# Patient Record
Sex: FEMALE | Race: BLACK OR AFRICAN AMERICAN | NOT HISPANIC OR LATINO | Employment: FULL TIME | URBAN - METROPOLITAN AREA
[De-identification: names, ages, dates, MRNs, and addresses within clinical notes are randomized per-mention and may not be internally consistent; named-entity substitution may affect disease eponyms.]

---

## 2018-05-04 PROBLEM — G47.33 OSA ON CPAP: Status: ACTIVE | Noted: 2018-05-04

## 2018-05-04 PROBLEM — Z00.00 PHYSICAL EXAM, ANNUAL: Status: ACTIVE | Noted: 2018-05-04

## 2018-05-04 PROBLEM — I11.9 HYPERTENSIVE LEFT VENTRICULAR HYPERTROPHY, WITHOUT HEART FAILURE: Status: ACTIVE | Noted: 2018-05-04

## 2018-05-04 PROBLEM — K91.2 POSTSURGICAL NONABSORPTION: Status: ACTIVE | Noted: 2018-05-04

## 2018-05-04 PROBLEM — E06.3 HASHIMOTO'S THYROIDITIS: Status: ACTIVE | Noted: 2017-02-22

## 2018-05-04 PROBLEM — E78.5 HYPERLIPIDEMIA: Status: ACTIVE | Noted: 2018-05-04

## 2018-05-04 PROBLEM — E55.9 VITAMIN D DEFICIENCY: Status: ACTIVE | Noted: 2018-05-04

## 2018-05-11 PROBLEM — D64.9 ANEMIA: Status: ACTIVE | Noted: 2018-05-11

## 2018-08-06 PROBLEM — Z00.00 PHYSICAL EXAM, ANNUAL: Status: RESOLVED | Noted: 2018-05-04 | Resolved: 2018-08-06

## 2018-12-20 PROBLEM — R74.8 ELEVATED LIVER ENZYMES: Status: ACTIVE | Noted: 2018-12-20

## 2018-12-20 PROBLEM — Z20.1 EXPOSURE TO TB: Status: ACTIVE | Noted: 2018-12-20

## 2019-01-03 PROBLEM — Z98.84 H/O BARIATRIC SURGERY: Status: ACTIVE | Noted: 2018-06-27

## 2019-01-28 PROBLEM — R74.8 ELEVATED LIVER ENZYMES: Status: RESOLVED | Noted: 2018-12-20 | Resolved: 2019-01-28

## 2019-05-23 PROBLEM — Z11.3 ROUTINE SCREENING FOR STI (SEXUALLY TRANSMITTED INFECTION): Status: ACTIVE | Noted: 2018-05-04

## 2019-05-23 PROBLEM — D72.819 LEUKOPENIA: Status: ACTIVE | Noted: 2019-05-23

## 2019-05-23 PROBLEM — M21.619 BUNION: Status: ACTIVE | Noted: 2019-05-23

## 2019-08-09 PROBLEM — Z01.818 PREOP TESTING: Status: ACTIVE | Noted: 2018-05-04

## 2019-08-10 PROBLEM — I11.0 HYPERTENSIVE LEFT VENTRICULAR HYPERTROPHY WITH HEART FAILURE: Status: ACTIVE | Noted: 2018-05-04

## 2019-11-29 PROBLEM — Z20.1 EXPOSURE TO TB: Status: RESOLVED | Noted: 2018-12-20 | Resolved: 2019-11-29

## 2019-11-29 PROBLEM — Z01.818 PREOP TESTING: Status: RESOLVED | Noted: 2018-05-04 | Resolved: 2019-11-29

## 2019-12-09 PROBLEM — R80.9 MICROALBUMINURIA: Status: ACTIVE | Noted: 2019-12-09

## 2020-03-24 PROBLEM — R05.9 COUGH: Status: ACTIVE | Noted: 2020-03-24

## 2020-07-30 PROBLEM — R05.9 COUGH: Status: RESOLVED | Noted: 2020-03-24 | Resolved: 2020-07-30

## 2020-09-18 ENCOUNTER — LAB VISIT (OUTPATIENT)
Dept: LAB | Facility: HOSPITAL | Age: 40
End: 2020-09-18
Attending: INTERNAL MEDICINE
Payer: COMMERCIAL

## 2020-09-18 ENCOUNTER — OFFICE VISIT (OUTPATIENT)
Dept: HEMATOLOGY/ONCOLOGY | Facility: CLINIC | Age: 40
End: 2020-09-18
Attending: FAMILY MEDICINE
Payer: COMMERCIAL

## 2020-09-18 VITALS
WEIGHT: 233.94 LBS | SYSTOLIC BLOOD PRESSURE: 143 MMHG | HEART RATE: 83 BPM | OXYGEN SATURATION: 100 % | DIASTOLIC BLOOD PRESSURE: 95 MMHG | HEIGHT: 62 IN | TEMPERATURE: 97 F | BODY MASS INDEX: 43.05 KG/M2

## 2020-09-18 DIAGNOSIS — D64.9 ANEMIA, UNSPECIFIED TYPE: ICD-10-CM

## 2020-09-18 DIAGNOSIS — N92.4 EXCESSIVE BLEEDING IN PREMENOPAUSAL PERIOD: ICD-10-CM

## 2020-09-18 DIAGNOSIS — D50.9 IRON DEFICIENCY ANEMIA, UNSPECIFIED IRON DEFICIENCY ANEMIA TYPE: Primary | ICD-10-CM

## 2020-09-18 DIAGNOSIS — Z86.718 HISTORY OF DEEP VENOUS THROMBOSIS (DVT) OF DISTAL VEIN OF RIGHT LOWER EXTREMITY: ICD-10-CM

## 2020-09-18 DIAGNOSIS — D72.819 LEUKOPENIA, UNSPECIFIED TYPE: ICD-10-CM

## 2020-09-18 LAB
BASOPHILS # BLD AUTO: 0.04 K/UL (ref 0–0.2)
BASOPHILS NFR BLD: 0.7 % (ref 0–1.9)
DIFFERENTIAL METHOD: ABNORMAL
EOSINOPHIL # BLD AUTO: 0.1 K/UL (ref 0–0.5)
EOSINOPHIL NFR BLD: 1.5 % (ref 0–8)
ERYTHROCYTE [DISTWIDTH] IN BLOOD BY AUTOMATED COUNT: 20.5 % (ref 11.5–14.5)
FERRITIN SERPL-MCNC: 30 NG/ML (ref 20–300)
HCT VFR BLD AUTO: 40 % (ref 37–48.5)
HGB BLD-MCNC: 12.4 G/DL (ref 12–16)
IMM GRANULOCYTES # BLD AUTO: 0.02 K/UL (ref 0–0.04)
IMM GRANULOCYTES NFR BLD AUTO: 0.3 % (ref 0–0.5)
IRON SERPL-MCNC: 38 UG/DL (ref 30–160)
LYMPHOCYTES # BLD AUTO: 2.1 K/UL (ref 1–4.8)
LYMPHOCYTES NFR BLD: 35.4 % (ref 18–48)
MCH RBC QN AUTO: 27.4 PG (ref 27–31)
MCHC RBC AUTO-ENTMCNC: 31 G/DL (ref 32–36)
MCV RBC AUTO: 89 FL (ref 82–98)
MONOCYTES # BLD AUTO: 0.6 K/UL (ref 0.3–1)
MONOCYTES NFR BLD: 9.4 % (ref 4–15)
NEUTROPHILS # BLD AUTO: 3.1 K/UL (ref 1.8–7.7)
NEUTROPHILS NFR BLD: 52.7 % (ref 38–73)
NRBC BLD-RTO: 0 /100 WBC
PATH REV BLD -IMP: NORMAL
PLATELET # BLD AUTO: 272 K/UL (ref 150–350)
PMV BLD AUTO: 9.1 FL (ref 9.2–12.9)
RBC # BLD AUTO: 4.52 M/UL (ref 4–5.4)
SATURATED IRON: 9 % (ref 20–50)
TOTAL IRON BINDING CAPACITY: 429 UG/DL (ref 250–450)
TRANSFERRIN SERPL-MCNC: 290 MG/DL (ref 200–375)
WBC # BLD AUTO: 5.84 K/UL (ref 3.9–12.7)

## 2020-09-18 PROCEDURE — 99999 PR PBB SHADOW E&M-EST. PATIENT-LVL V: CPT | Mod: PBBFAC,,, | Performed by: INTERNAL MEDICINE

## 2020-09-18 PROCEDURE — 36415 COLL VENOUS BLD VENIPUNCTURE: CPT

## 2020-09-18 PROCEDURE — 99204 OFFICE O/P NEW MOD 45 MIN: CPT | Mod: S$GLB,,, | Performed by: INTERNAL MEDICINE

## 2020-09-18 PROCEDURE — 99204 PR OFFICE/OUTPT VISIT, NEW, LEVL IV, 45-59 MIN: ICD-10-PCS | Mod: S$GLB,,, | Performed by: INTERNAL MEDICINE

## 2020-09-18 PROCEDURE — 85025 COMPLETE CBC W/AUTO DIFF WBC: CPT

## 2020-09-18 PROCEDURE — 83540 ASSAY OF IRON: CPT

## 2020-09-18 PROCEDURE — 99999 PR PBB SHADOW E&M-EST. PATIENT-LVL V: ICD-10-PCS | Mod: PBBFAC,,, | Performed by: INTERNAL MEDICINE

## 2020-09-18 PROCEDURE — 82728 ASSAY OF FERRITIN: CPT

## 2020-09-18 NOTE — PATIENT INSTRUCTIONS
"Labs today, cbc, iron/tibc, ferritin, peripheral blood smear  She will provide fax for "clearance"  RV in 3 months with repeat iron levels 1 week prior; cbc, iron/tibc, ferritin  "

## 2020-09-18 NOTE — PROGRESS NOTES
Subjective:      DATE OF VISIT: 9/18/20     ?  Patient ID:?Marcia Hazel is a 40 y.o. female.?? MR#: [id number]   ?   REFERRING PROVIDER: Marcia Calix MD  [address] [address]     ? Primary Care Providers:  Marcia Calix MD, MD (General)     CHIEF COMPLAINT: ?  Iron deficiency anemia??   ?   HPI    I had the pleasure meeting scale 40-year-old woman with history of obesity status post gastric sleeve 2016 with hiatal hernia and GERD looking forward to hiatal hernia surgery.  She also has history of menorrhagia and has been following with Gynecology.  She notes prior use of estrogen containing oral contraceptive and has been off since May. She does note history of right lower extremity DVT in calf in the setting of immobility after a surgery and completed about 6 months of Eliquis.     She has been following by outside hematologist/oncologist for history of iron deficiency anemia.  She notes in the past receiving 8 doses of IV iron and more recently in August receive 4 more doses of IV iron therapy with subsequent lab work showing mild anemia hemoglobin 11, normal saturation, TIBC, transferrin.  No ferritin checked.  Patient also notes her outside oncologist had concerns about white blood cell count however per these labs within normal limits and normal differential.    She continues to have menorrhagia.  She denies melena, hematochezia, hematuria, hemoptysis, hematemesis, fever, chills, recurrent infection/hospitalization, or unintentional weight loss.  She does endorse reflux disease and looking forward to surgery but requires clearance from different specialties including E hematology.    She has mammogram for breast cancer screening which she reports is benign.  She has never had colonoscopy.  No family history of malignancy.    Review of Systems    ?   A comprehensive 14-point review of systems was reviewed with patient and was negative other than as specified above.   ?   PAST  MEDICAL HISTORY:   Past Medical History:   Diagnosis Date    Acquired hypothyroidism     Benign hypertension     Encounter for general adult medical examination without abnormal findings 04/10/2017    GERD (gastroesophageal reflux disease)     Hyperlipidemia     Hyperlipidemia     Iron deficiency anemia     Metabolic syndrome X     ANTONIA on CPAP     Postsurgical malabsorption     ?     PAST SURGICAL HISTORY:   Past Surgical History:   Procedure Laterality Date    BELT ABDOMINOPLASTY      BUNIONECTOMY Right     GASTROSTOMY      partial    HERNIA REPAIR      MYOMECTOMY        ?   ALLERGIES:   Allergies as of 09/18/2020 - Reviewed 09/18/2020   Allergen Reaction Noted    Lisinopril Swelling 02/22/2017      ?   MEDICATIONS:?   Outpatient Medications Marked as Taking for the 9/18/20 encounter (Office Visit) with Desire Villa MD   Medication Sig Dispense Refill    carvediloL (COREG) 12.5 MG tablet TAKE 1 TABLET(12.5 MG) BY MOUTH TWICE DAILY WITH A MEAL 60 tablet 2    chlorhexidine (PERIDEX) 0.12 % solution   0    cyclobenzaprine (FLEXERIL) 10 MG tablet TAKE 1 TABLET(10 MG) BY MOUTH THREE TIMES DAILY AS NEEDED 30 tablet 2    dexAMETHasone (DECADRON) 1 MG Tab Patient to take pill at midnight and then get FASTING labs done eight hours later (8am) as per ordered by physician.      docusate sodium (COLACE) 100 MG capsule Take 100 mg by mouth 2 (two) times daily.      drospirenone, contraceptive, (SLYND) 4 mg (28) Tab       ergocalciferol (ERGOCALCIFEROL) 50,000 unit Cap TK 1 C PO 1 TIME A WK  5    furosemide (LASIX) 20 MG tablet TK 1 T PO  BID      hydroCHLOROthiazide (HYDRODIURIL) 25 MG tablet TAKE 1 TABLET BY MOUTH DAILY 30 tablet 3    levothyroxine (SYNTHROID) 137 MCG Tab tablet Take 137 mcg by mouth.      metFORMIN (GLUCOPHAGE-XR) 500 MG 24 hr tablet 2 tablets twice daily  11    oxyCODONE-acetaminophen (PERCOCET) 5-325 mg per tablet TAKE 1 TO 2 TABLETS BY MOUTH EVERY 4 TO 6 HOURS PRN P  0     pantoprazole (PROTONIX) 40 MG tablet TK 1 T PO  BID  1    valACYclovir (VALTREX) 1000 MG tablet TK 1 T PO ONCE D  2      ?   SOCIAL HISTORY:?   Social History     Tobacco Use    Smoking status: Never Smoker    Smokeless tobacco: Never Used   Substance Use Topics    Alcohol use: Yes     Comment: social      ?      ?   FAMILY HISTORY:   family history includes Diabetes in her mother; Hypertension in her mother.   ?        Objective:      Physical Exam      ?   Vitals:    09/18/20 0852   BP: (!) 143/95   Pulse: 83   Temp: 97 °F (36.1 °C)      ?ECOG:?0   General appearance: Generally well appearing, in no acute distress.   Head, eyes, ears, nose, and throat: moist mucous membranes.   Respiratory:  Normal work of breathing  Abdomen: nontender, nondistended.   Extremities: Warm, without edema.   Neurologic: Alert and oriented. Grossly normal strength, coordination, and gait.   Skin: No rashes, ecchymoses or petechial lesion.   Psychiatric:  Normal mood and affect.    ?   Laboratory:  ?   No visits with results within 1 Day(s) from this visit.   Latest known visit with results is:   Office Visit on 07/17/2020   Component Date Value Ref Range Status    CREATININE URINE 07/17/2020 100   Final    Albumin, U 07/17/2020 30   Final    Albumin Creatinine Ratio 07/17/2020 * mg/g Final    Spec Grav UA 07/17/2020 1.015   Final    pH, UA 07/17/2020 8.0   Final    WBC, UA 07/17/2020 neg   Final    Blood, UA 07/17/2020 neg   Final    Nitrite, UA 07/17/2020 neg   Final    Ketones, UA 07/17/2020 neg   Final    Bilirubin 07/17/2020 neg   Final    Urobilinogen, UA 07/17/2020 0.2   Final    Protein 07/17/2020 neg   Final    Glucose, UA 07/17/2020 neg   Final    Bacteria 07/17/2020 None Seen  None Seen, Occasional, 1-5 Final    Epithelial Cells, Ua 07/17/2020 occ   Final    Glucose 07/17/2020 77  65 - 99 mg/dL Final    BUN, Bld 07/17/2020 16  6 - 24 mg/dL Final    Creatinine 07/17/2020 0.82  0.57 - 1.00 mg/dL  Final    eGFR if non African American 07/17/2020 89  >59 mL/min/1.73 Final    eGFR if African American 07/17/2020 103  >59 mL/min/1.73 Final    BUN/Creatinine Ratio 07/17/2020 20  9 - 23 Final    Sodium 07/17/2020 139  134 - 144 mmol/L Final    Potassium 07/17/2020 4.0  3.5 - 5.2 mmol/L Final    Chloride 07/17/2020 101  96 - 106 mmol/L Final    CO2 07/17/2020 27  20 - 29 mmol/L Final    Calcium 07/17/2020 9.3  8.7 - 10.2 mg/dL Final    Total Protein 07/17/2020 6.8  6.0 - 8.5 g/dL Final    Albumin 07/17/2020 3.8  3.8 - 4.8 g/dL Final    Globulin, Total 07/17/2020 3.0  1.5 - 4.5 g/dL Final    Albumin/Globulin Ratio 07/17/2020 1.3  1.2 - 2.2 Final    Total Bilirubin 07/17/2020 <0.2  0.0 - 1.2 mg/dL Final    Alkaline Phosphatase 07/17/2020 52  39 - 117 IU/L Final    AST 07/17/2020 17  0 - 40 IU/L Final    ALT 07/17/2020 10  0 - 32 IU/L Final    Hep A IgM 07/17/2020 Negative  Negative Final    Hepatitis B Surface Ag 07/17/2020 Negative  Negative Final    Hep B C IgM 07/17/2020 Negative  Negative Final    Hep C Virus Ab Signal/Cutoff 07/17/2020 0.1  0.0 - 0.9 s/co ratio Final    HIV Screen 4th Generation wRfx 07/17/2020 Non Reactive  Non Reactive Final    Cholesterol 07/17/2020 172  100 - 199 mg/dL Final    Triglycerides 07/17/2020 57  0 - 149 mg/dL Final    HDL 07/17/2020 74  >39 mg/dL Final    VLDL Cholesterol Oren 07/17/2020 11  5 - 40 mg/dL Final    LDL Calculated 07/17/2020 87  0 - 99 mg/dL Final    RPR 07/17/2020 Non Reactive  Non Reactive Final    T3, Free 07/17/2020 2.5  2.0 - 4.4 pg/mL Final    T4, Free 07/17/2020 1.68  0.82 - 1.77 ng/dL Final    TSH 07/17/2020 0.656  0.450 - 4.500 uIU/mL Final    Vit D, 25-Hydroxy 07/17/2020 48.3  30.0 - 100.0 ng/mL Final          ?   Assessment/Plan:       1. Iron deficiency anemia, unspecified iron deficiency anemia type    2. Anemia, unspecified type    3. Excessive bleeding in premenopausal period    4. History of deep venous thrombosis (DVT) of  "distal vein of right lower extremity          Plan:     # anemia, iron deficiency:  May be multifactorial with menorrhagia and possible malabsorption with history of gastric sleeve, hiatal hernia and GERD.  Most recent labs show relatively normal hemoglobin 11 and iron studies from outside appear replete although no ferritin.  Will repeat complete set of iron indices and CBC along with peripheral blood smear to review given prior question of leukopenia.  Recommend follow-up with GI for hiatal hernia/GERD and Gynecology for menorrhagia.    # leukopenia: Labs reviewed from 2018 to 2020 show low normal white blood cell count with normal differential.  Will repeat CBC and obtain peripheral blood smear for review of morphology.  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  No concerning history of constitutional symptoms or recurrent infections.    # history of DVT right lower extremity:  She notes history of DVT right calf in August 2019 the setting of immobility with recent orthopedic surgery completed about 6 months of Eliquis.  Discussed oral contraceptives especially ask and estrogen containing can increase risk of thrombosis and would try to avoid given prior history of thrombosis although does appear to have been provoked.  She expressed understanding will discuss with her gynecologist.    Follow-Up:   Labs today, cbc, iron/tibc, ferritin, peripheral blood smear  She will provide fax for "clearance"  RV in 3 months with repeat iron levels 1 week prior; cbc, iron/tibc, ferritin    ADDENDUM 09/19/2020     Lab work shows resolution of prior anemia and iron deficiency although iron level low normal range.  White blood cell count and differential within normal limits.  Peripheral blood smear review pending at this time.  Given menorrhagia recommend oral iron supplementation to help in maintenance of iron levels e.g. ferrous sulfate 325 mg 1-2 tabs daily with stool softeners as needed.  " There does not appear to be any restrictions from hematologic perspective for her to post pursue planned surgery.      Lab Results   Component Value Date    IRON 38 09/18/2020    TIBC 429 09/18/2020    FERRITIN 30 09/18/2020     Lab Results   Component Value Date    WBC 5.84 09/18/2020    HGB 12.4 09/18/2020    HCT 40.0 09/18/2020    MCV 89 09/18/2020     09/18/2020

## 2020-09-18 NOTE — LETTER
September 18, 2020      Marcia Calix MD  [address]  [facility] [address]            [facility]  [address] [facility] [address]  Phone: [phone]  Fax: [phone]          Patient: Marcia Hazel   MR Number: [id number]   YOB: 1980   Date of Visit: 9/18/2020       Dear Dr. Marcia Calix:    Thank you for referring Marcia Hazel to me for evaluation. Attached you will find relevant portions of my assessment and plan of care.    If you have questions, please do not hesitate to call me. I look forward to following aMrcia Hazel along with you.    Sincerely,    Desire Villa MD    Enclosure  CC:  No Recipients    If you would like to receive this communication electronically, please contact [email or web address] or [phone] to request more information on [email or web address] Link access.    For providers and/or their staff who would like to refer a patient to [facility], please contact us through our one-stop-shop provider referral line, [facility], at [phone].    If you feel you have received this communication in error or would no longer like to receive these types of communications, please e-mail [email or web address]

## 2020-09-22 ENCOUNTER — TELEPHONE (OUTPATIENT)
Dept: HEMATOLOGY/ONCOLOGY | Facility: CLINIC | Age: 40
End: 2020-09-22

## 2020-09-22 NOTE — TELEPHONE ENCOUNTER
----- Message from Jailyn Coker sent at 9/22/2020  1:45 PM CDT -----  Contact: Jenni/Dr. Tsering Arteaga from Dr. Hanks called regarding a pre-op clearance for the pt. Please call her back at [phone].    Thanks,  Pb

## 2020-09-22 NOTE — TELEPHONE ENCOUNTER
----- Message from Agata Ramirez sent at 9/22/2020  4:17 PM CDT -----  .Type:  Patient Returning Call    Who Called:pt  Who Left Message for Patient:Gaye  Does the patient know what this is regarding?:yes  Would the patient rather a call back or [facility]? Call back  Best Call Back Number:[phone]  Additional Information:

## 2020-09-22 NOTE — TELEPHONE ENCOUNTER
----- Message from Andrés Palacios sent at 9/22/2020 11:29 AM CDT -----  .Type:  Needs Medical Advice    Who Called: INDRA MCCLENDON  Symptoms (please be specific):    How long has patient had these symptoms:    Pharmacy name and phone #:   Would the patient rather a call back or a response via [facility]?  Both   Best Call Back Number: [phone] (home)    Additional Information:   Pt is requesting a call back from the nurse in regards to the pt Dr office not receiving the fax from our office yet please

## 2020-09-22 NOTE — TELEPHONE ENCOUNTER
Nurse spoke with nurse at dr dover office to re fax dr ramachandran note they have not received the first one faxed this morning.

## 2020-09-29 ENCOUNTER — LAB VISIT (OUTPATIENT)
Dept: PRIMARY CARE CLINIC | Facility: OTHER | Age: 40
End: 2020-09-29
Attending: INTERNAL MEDICINE
Payer: COMMERCIAL

## 2020-09-29 DIAGNOSIS — Z03.818 ENCOUNTER FOR OBSERVATION FOR SUSPECTED EXPOSURE TO OTHER BIOLOGICAL AGENTS RULED OUT: ICD-10-CM

## 2020-09-29 PROCEDURE — U0003 INFECTIOUS AGENT DETECTION BY NUCLEIC ACID (DNA OR RNA); SEVERE ACUTE RESPIRATORY SYNDROME CORONAVIRUS 2 (SARS-COV-2) (CORONAVIRUS DISEASE [COVID-19]), AMPLIFIED PROBE TECHNIQUE, MAKING USE OF HIGH THROUGHPUT TECHNOLOGIES AS DESCRIBED BY CMS-2020-01-R: HCPCS

## 2020-10-01 LAB — SARS-COV-2 RNA RESP QL NAA+PROBE: NOT DETECTED

## 2020-10-20 ENCOUNTER — LAB VISIT (OUTPATIENT)
Dept: PRIMARY CARE CLINIC | Facility: OTHER | Age: 40
End: 2020-10-20
Attending: INTERNAL MEDICINE
Payer: COMMERCIAL

## 2020-10-20 DIAGNOSIS — Z03.818 ENCOUNTER FOR OBSERVATION FOR SUSPECTED EXPOSURE TO OTHER BIOLOGICAL AGENTS RULED OUT: ICD-10-CM

## 2020-10-20 PROCEDURE — U0003 INFECTIOUS AGENT DETECTION BY NUCLEIC ACID (DNA OR RNA); SEVERE ACUTE RESPIRATORY SYNDROME CORONAVIRUS 2 (SARS-COV-2) (CORONAVIRUS DISEASE [COVID-19]), AMPLIFIED PROBE TECHNIQUE, MAKING USE OF HIGH THROUGHPUT TECHNOLOGIES AS DESCRIBED BY CMS-2020-01-R: HCPCS

## 2020-10-21 LAB — SARS-COV-2 RNA RESP QL NAA+PROBE: NOT DETECTED

## 2020-12-03 DIAGNOSIS — D50.9 IRON DEFICIENCY ANEMIA, UNSPECIFIED IRON DEFICIENCY ANEMIA TYPE: Primary | ICD-10-CM

## 2020-12-15 ENCOUNTER — LAB VISIT (OUTPATIENT)
Dept: PRIMARY CARE CLINIC | Facility: OTHER | Age: 40
End: 2020-12-15
Payer: COMMERCIAL

## 2020-12-15 DIAGNOSIS — Z03.818 ENCOUNTER FOR OBSERVATION FOR SUSPECTED EXPOSURE TO OTHER BIOLOGICAL AGENTS RULED OUT: ICD-10-CM

## 2020-12-15 PROCEDURE — U0003 INFECTIOUS AGENT DETECTION BY NUCLEIC ACID (DNA OR RNA); SEVERE ACUTE RESPIRATORY SYNDROME CORONAVIRUS 2 (SARS-COV-2) (CORONAVIRUS DISEASE [COVID-19]), AMPLIFIED PROBE TECHNIQUE, MAKING USE OF HIGH THROUGHPUT TECHNOLOGIES AS DESCRIBED BY CMS-2020-01-R: HCPCS

## 2020-12-17 LAB — SARS-COV-2 RNA RESP QL NAA+PROBE: NOT DETECTED

## 2021-01-08 ENCOUNTER — LAB VISIT (OUTPATIENT)
Dept: LAB | Facility: HOSPITAL | Age: 41
End: 2021-01-08
Attending: INTERNAL MEDICINE
Payer: COMMERCIAL

## 2021-01-08 DIAGNOSIS — D50.9 IRON DEFICIENCY ANEMIA, UNSPECIFIED IRON DEFICIENCY ANEMIA TYPE: ICD-10-CM

## 2021-01-08 LAB
ANISOCYTOSIS BLD QL SMEAR: SLIGHT
BASOPHILS # BLD AUTO: 0.05 K/UL (ref 0–0.2)
BASOPHILS NFR BLD: 1.3 % (ref 0–1.9)
DIFFERENTIAL METHOD: ABNORMAL
EOSINOPHIL # BLD AUTO: 0.1 K/UL (ref 0–0.5)
EOSINOPHIL NFR BLD: 2.5 % (ref 0–8)
ERYTHROCYTE [DISTWIDTH] IN BLOOD BY AUTOMATED COUNT: 13.7 % (ref 11.5–14.5)
GIANT PLATELETS BLD QL SMEAR: PRESENT
HCT VFR BLD AUTO: 37.8 % (ref 37–48.5)
HGB BLD-MCNC: 11.5 G/DL (ref 12–16)
HYPOCHROMIA BLD QL SMEAR: ABNORMAL
IMM GRANULOCYTES # BLD AUTO: 0.01 K/UL (ref 0–0.04)
IMM GRANULOCYTES NFR BLD AUTO: 0.3 % (ref 0–0.5)
LYMPHOCYTES # BLD AUTO: 1.6 K/UL (ref 1–4.8)
LYMPHOCYTES NFR BLD: 40.5 % (ref 18–48)
MCH RBC QN AUTO: 29.8 PG (ref 27–31)
MCHC RBC AUTO-ENTMCNC: 30.4 G/DL (ref 32–36)
MCV RBC AUTO: 98 FL (ref 82–98)
MONOCYTES # BLD AUTO: 0.6 K/UL (ref 0.3–1)
MONOCYTES NFR BLD: 14.7 % (ref 4–15)
NEUTROPHILS # BLD AUTO: 1.6 K/UL (ref 1.8–7.7)
NEUTROPHILS NFR BLD: 40.7 % (ref 38–73)
NRBC BLD-RTO: 0 /100 WBC
OVALOCYTES BLD QL SMEAR: ABNORMAL
PATH REV BLD -IMP: NORMAL
PLATELET # BLD AUTO: 306 K/UL (ref 150–350)
PLATELET BLD QL SMEAR: ABNORMAL
PMV BLD AUTO: 9.7 FL (ref 9.2–12.9)
POIKILOCYTOSIS BLD QL SMEAR: SLIGHT
POLYCHROMASIA BLD QL SMEAR: ABNORMAL
RBC # BLD AUTO: 3.86 M/UL (ref 4–5.4)
SCHISTOCYTES BLD QL SMEAR: ABNORMAL
WBC # BLD AUTO: 3.95 K/UL (ref 3.9–12.7)

## 2021-01-08 PROCEDURE — 82728 ASSAY OF FERRITIN: CPT

## 2021-01-08 PROCEDURE — 83540 ASSAY OF IRON: CPT

## 2021-01-08 PROCEDURE — 36415 COLL VENOUS BLD VENIPUNCTURE: CPT

## 2021-01-08 PROCEDURE — 85060 BLOOD SMEAR INTERPRETATION: CPT | Mod: ,,, | Performed by: PATHOLOGY

## 2021-01-08 PROCEDURE — 85025 COMPLETE CBC W/AUTO DIFF WBC: CPT

## 2021-01-08 PROCEDURE — 85060 PATHOLOGIST REVIEW: ICD-10-PCS | Mod: ,,, | Performed by: PATHOLOGY

## 2021-01-09 LAB
FERRITIN SERPL-MCNC: 8 NG/ML (ref 20–300)
IRON SERPL-MCNC: 40 UG/DL (ref 30–160)
SATURATED IRON: 8 % (ref 20–50)
TOTAL IRON BINDING CAPACITY: 481 UG/DL (ref 250–450)
TRANSFERRIN SERPL-MCNC: 325 MG/DL (ref 200–375)

## 2021-01-11 LAB — PATH REV BLD -IMP: NORMAL

## 2021-01-12 ENCOUNTER — LAB VISIT (OUTPATIENT)
Dept: PRIMARY CARE CLINIC | Facility: OTHER | Age: 41
End: 2021-01-12
Payer: COMMERCIAL

## 2021-01-12 DIAGNOSIS — Z20.822 ENCOUNTER FOR LABORATORY TESTING FOR COVID-19 VIRUS: ICD-10-CM

## 2021-01-12 PROCEDURE — U0003 INFECTIOUS AGENT DETECTION BY NUCLEIC ACID (DNA OR RNA); SEVERE ACUTE RESPIRATORY SYNDROME CORONAVIRUS 2 (SARS-COV-2) (CORONAVIRUS DISEASE [COVID-19]), AMPLIFIED PROBE TECHNIQUE, MAKING USE OF HIGH THROUGHPUT TECHNOLOGIES AS DESCRIBED BY CMS-2020-01-R: HCPCS

## 2021-01-13 ENCOUNTER — OFFICE VISIT (OUTPATIENT)
Dept: HEMATOLOGY/ONCOLOGY | Facility: CLINIC | Age: 41
End: 2021-01-13
Payer: COMMERCIAL

## 2021-01-13 DIAGNOSIS — D64.9 ANEMIA, UNSPECIFIED TYPE: Primary | ICD-10-CM

## 2021-01-13 DIAGNOSIS — D72.819 LEUKOPENIA, UNSPECIFIED TYPE: ICD-10-CM

## 2021-01-13 DIAGNOSIS — Z98.84 H/O BARIATRIC SURGERY: ICD-10-CM

## 2021-01-13 DIAGNOSIS — D50.8 OTHER IRON DEFICIENCY ANEMIA: ICD-10-CM

## 2021-01-13 DIAGNOSIS — D50.9 IRON DEFICIENCY ANEMIA, UNSPECIFIED IRON DEFICIENCY ANEMIA TYPE: ICD-10-CM

## 2021-01-13 PROCEDURE — 99214 OFFICE O/P EST MOD 30 MIN: CPT | Mod: 95,,, | Performed by: NURSE PRACTITIONER

## 2021-01-13 PROCEDURE — 99214 PR OFFICE/OUTPT VISIT, EST, LEVL IV, 30-39 MIN: ICD-10-PCS | Mod: 95,,, | Performed by: NURSE PRACTITIONER

## 2021-01-13 RX ORDER — HEPARIN 100 UNIT/ML
500 SYRINGE INTRAVENOUS
Status: CANCELLED | OUTPATIENT
Start: 2021-01-13

## 2021-01-13 RX ORDER — SODIUM CHLORIDE 0.9 % (FLUSH) 0.9 %
10 SYRINGE (ML) INJECTION
Status: CANCELLED | OUTPATIENT
Start: 2021-01-13

## 2021-01-13 RX ORDER — HEPARIN 100 UNIT/ML
500 SYRINGE INTRAVENOUS
Status: CANCELLED | OUTPATIENT
Start: 2021-01-29

## 2021-01-13 RX ORDER — SODIUM CHLORIDE 0.9 % (FLUSH) 0.9 %
10 SYRINGE (ML) INJECTION
Status: CANCELLED | OUTPATIENT
Start: 2021-01-29

## 2021-01-14 LAB — SARS-COV-2 RNA RESP QL NAA+PROBE: NOT DETECTED

## 2021-01-28 ENCOUNTER — INFUSION (OUTPATIENT)
Dept: INFUSION THERAPY | Facility: HOSPITAL | Age: 41
End: 2021-01-28
Attending: INTERNAL MEDICINE
Payer: COMMERCIAL

## 2021-01-28 VITALS
SYSTOLIC BLOOD PRESSURE: 135 MMHG | TEMPERATURE: 98 F | BODY MASS INDEX: 40.16 KG/M2 | HEART RATE: 69 BPM | DIASTOLIC BLOOD PRESSURE: 86 MMHG | WEIGHT: 226.63 LBS | HEIGHT: 63 IN | RESPIRATION RATE: 18 BRPM

## 2021-01-28 DIAGNOSIS — D50.8 OTHER IRON DEFICIENCY ANEMIA: Primary | ICD-10-CM

## 2021-01-28 PROCEDURE — 25000003 PHARM REV CODE 250: Performed by: NURSE PRACTITIONER

## 2021-01-28 PROCEDURE — 96365 THER/PROPH/DIAG IV INF INIT: CPT

## 2021-01-28 PROCEDURE — 63600175 PHARM REV CODE 636 W HCPCS: Mod: JG | Performed by: NURSE PRACTITIONER

## 2021-01-28 RX ADMIN — FERUMOXYTOL 510 MG: 510 INJECTION INTRAVENOUS at 09:01

## 2021-02-03 RX ORDER — SODIUM CHLORIDE 0.9 % (FLUSH) 0.9 %
10 SYRINGE (ML) INJECTION
Status: CANCELLED | OUTPATIENT
Start: 2021-02-03

## 2021-02-03 RX ORDER — HEPARIN 100 UNIT/ML
500 SYRINGE INTRAVENOUS
Status: CANCELLED | OUTPATIENT
Start: 2021-02-03

## 2021-03-02 ENCOUNTER — INFUSION (OUTPATIENT)
Dept: INFUSION THERAPY | Facility: HOSPITAL | Age: 41
End: 2021-03-02
Attending: INTERNAL MEDICINE
Payer: COMMERCIAL

## 2021-03-02 VITALS
HEIGHT: 63 IN | TEMPERATURE: 97 F | WEIGHT: 226.06 LBS | HEART RATE: 76 BPM | SYSTOLIC BLOOD PRESSURE: 123 MMHG | BODY MASS INDEX: 40.05 KG/M2 | RESPIRATION RATE: 18 BRPM | DIASTOLIC BLOOD PRESSURE: 81 MMHG

## 2021-03-02 DIAGNOSIS — D50.8 OTHER IRON DEFICIENCY ANEMIA: Primary | ICD-10-CM

## 2021-03-02 PROCEDURE — 63600175 PHARM REV CODE 636 W HCPCS: Performed by: NURSE PRACTITIONER

## 2021-03-02 PROCEDURE — 96374 THER/PROPH/DIAG INJ IV PUSH: CPT

## 2021-03-02 RX ADMIN — IRON SUCROSE 200 MG: 20 INJECTION, SOLUTION INTRAVENOUS at 09:03

## 2021-03-09 ENCOUNTER — INFUSION (OUTPATIENT)
Dept: INFUSION THERAPY | Facility: HOSPITAL | Age: 41
End: 2021-03-09
Attending: INTERNAL MEDICINE
Payer: COMMERCIAL

## 2021-03-09 VITALS
BODY MASS INDEX: 40.55 KG/M2 | DIASTOLIC BLOOD PRESSURE: 69 MMHG | RESPIRATION RATE: 17 BRPM | SYSTOLIC BLOOD PRESSURE: 115 MMHG | HEART RATE: 73 BPM | WEIGHT: 225.31 LBS | TEMPERATURE: 97 F

## 2021-03-09 DIAGNOSIS — D50.8 OTHER IRON DEFICIENCY ANEMIA: Primary | ICD-10-CM

## 2021-03-09 PROCEDURE — 96365 THER/PROPH/DIAG IV INF INIT: CPT

## 2021-03-09 PROCEDURE — 63600175 PHARM REV CODE 636 W HCPCS: Performed by: NURSE PRACTITIONER

## 2021-03-09 RX ORDER — SODIUM CHLORIDE 0.9 % (FLUSH) 0.9 %
10 SYRINGE (ML) INJECTION
Status: CANCELLED | OUTPATIENT
Start: 2021-03-09

## 2021-03-09 RX ORDER — HEPARIN 100 UNIT/ML
500 SYRINGE INTRAVENOUS
Status: CANCELLED | OUTPATIENT
Start: 2021-03-09

## 2021-03-09 RX ADMIN — IRON SUCROSE 200 MG: 20 INJECTION, SOLUTION INTRAVENOUS at 09:03

## 2021-03-16 ENCOUNTER — INFUSION (OUTPATIENT)
Dept: INFUSION THERAPY | Facility: HOSPITAL | Age: 41
End: 2021-03-16
Attending: INTERNAL MEDICINE
Payer: COMMERCIAL

## 2021-03-16 VITALS
HEART RATE: 74 BPM | OXYGEN SATURATION: 95 % | TEMPERATURE: 98 F | RESPIRATION RATE: 18 BRPM | SYSTOLIC BLOOD PRESSURE: 131 MMHG | WEIGHT: 224.19 LBS | BODY MASS INDEX: 40.35 KG/M2 | DIASTOLIC BLOOD PRESSURE: 92 MMHG

## 2021-03-16 DIAGNOSIS — D50.8 OTHER IRON DEFICIENCY ANEMIA: Primary | ICD-10-CM

## 2021-03-16 PROCEDURE — 63600175 PHARM REV CODE 636 W HCPCS: Performed by: NURSE PRACTITIONER

## 2021-03-16 PROCEDURE — 96365 THER/PROPH/DIAG IV INF INIT: CPT

## 2021-03-16 RX ORDER — SODIUM CHLORIDE 0.9 % (FLUSH) 0.9 %
10 SYRINGE (ML) INJECTION
Status: DISCONTINUED | OUTPATIENT
Start: 2021-03-16 | End: 2021-03-16 | Stop reason: HOSPADM

## 2021-03-16 RX ORDER — HEPARIN 100 UNIT/ML
500 SYRINGE INTRAVENOUS
Status: CANCELLED | OUTPATIENT
Start: 2021-03-16

## 2021-03-16 RX ORDER — SODIUM CHLORIDE 0.9 % (FLUSH) 0.9 %
10 SYRINGE (ML) INJECTION
Status: CANCELLED | OUTPATIENT
Start: 2021-03-16

## 2021-03-16 RX ADMIN — IRON SUCROSE 200 MG: 20 INJECTION, SOLUTION INTRAVENOUS at 09:03

## 2021-03-22 RX ORDER — SODIUM CHLORIDE 0.9 % (FLUSH) 0.9 %
10 SYRINGE (ML) INJECTION
Status: CANCELLED | OUTPATIENT
Start: 2021-03-22

## 2021-03-22 RX ORDER — HEPARIN 100 UNIT/ML
500 SYRINGE INTRAVENOUS
Status: CANCELLED | OUTPATIENT
Start: 2021-03-22

## 2021-03-23 ENCOUNTER — LAB VISIT (OUTPATIENT)
Dept: LAB | Facility: HOSPITAL | Age: 41
End: 2021-03-23
Attending: INTERNAL MEDICINE
Payer: COMMERCIAL

## 2021-03-23 ENCOUNTER — INFUSION (OUTPATIENT)
Dept: INFUSION THERAPY | Facility: HOSPITAL | Age: 41
End: 2021-03-23
Attending: INTERNAL MEDICINE
Payer: COMMERCIAL

## 2021-03-23 VITALS
RESPIRATION RATE: 18 BRPM | HEART RATE: 75 BPM | TEMPERATURE: 98 F | WEIGHT: 223 LBS | BODY MASS INDEX: 40.14 KG/M2 | SYSTOLIC BLOOD PRESSURE: 126 MMHG | DIASTOLIC BLOOD PRESSURE: 78 MMHG

## 2021-03-23 DIAGNOSIS — E06.3 CHRONIC LYMPHOCYTIC THYROIDITIS: ICD-10-CM

## 2021-03-23 DIAGNOSIS — E88.810 DYSMETABOLIC SYNDROME X: ICD-10-CM

## 2021-03-23 DIAGNOSIS — E06.5 HYPOTHYROIDISM DUE TO FIBROUS INVASIVE THYROIDITIS: Primary | ICD-10-CM

## 2021-03-23 DIAGNOSIS — E55.9 AVITAMINOSIS D: ICD-10-CM

## 2021-03-23 DIAGNOSIS — E03.8 HYPOTHYROIDISM DUE TO FIBROUS INVASIVE THYROIDITIS: ICD-10-CM

## 2021-03-23 DIAGNOSIS — E03.8 HYPOTHYROIDISM DUE TO FIBROUS INVASIVE THYROIDITIS: Primary | ICD-10-CM

## 2021-03-23 DIAGNOSIS — D50.8 OTHER IRON DEFICIENCY ANEMIA: Primary | ICD-10-CM

## 2021-03-23 DIAGNOSIS — E06.5 HYPOTHYROIDISM DUE TO FIBROUS INVASIVE THYROIDITIS: ICD-10-CM

## 2021-03-23 LAB
ALBUMIN SERPL BCP-MCNC: 3.2 G/DL (ref 3.5–5.2)
ANION GAP SERPL CALC-SCNC: 7 MMOL/L (ref 8–16)
BUN SERPL-MCNC: 17 MG/DL (ref 6–20)
CALCIUM SERPL-MCNC: 8.5 MG/DL (ref 8.7–10.5)
CHLORIDE SERPL-SCNC: 105 MMOL/L (ref 95–110)
CO2 SERPL-SCNC: 26 MMOL/L (ref 23–29)
CREAT SERPL-MCNC: 0.8 MG/DL (ref 0.5–1.4)
EST. GFR  (AFRICAN AMERICAN): >60 ML/MIN/1.73 M^2
EST. GFR  (NON AFRICAN AMERICAN): >60 ML/MIN/1.73 M^2
GLUCOSE SERPL-MCNC: 64 MG/DL (ref 70–110)
PHOSPHATE SERPL-MCNC: 3.3 MG/DL (ref 2.7–4.5)
POTASSIUM SERPL-SCNC: 3.5 MMOL/L (ref 3.5–5.1)
SODIUM SERPL-SCNC: 138 MMOL/L (ref 136–145)

## 2021-03-23 PROCEDURE — 84443 ASSAY THYROID STIM HORMONE: CPT | Performed by: INTERNAL MEDICINE

## 2021-03-23 PROCEDURE — 80069 RENAL FUNCTION PANEL: CPT | Performed by: INTERNAL MEDICINE

## 2021-03-23 PROCEDURE — 96374 THER/PROPH/DIAG INJ IV PUSH: CPT

## 2021-03-23 PROCEDURE — 82306 VITAMIN D 25 HYDROXY: CPT | Performed by: INTERNAL MEDICINE

## 2021-03-23 PROCEDURE — 84439 ASSAY OF FREE THYROXINE: CPT | Performed by: INTERNAL MEDICINE

## 2021-03-23 PROCEDURE — 63600175 PHARM REV CODE 636 W HCPCS: Performed by: NURSE PRACTITIONER

## 2021-03-23 PROCEDURE — 36415 COLL VENOUS BLD VENIPUNCTURE: CPT | Performed by: INTERNAL MEDICINE

## 2021-03-23 RX ADMIN — IRON SUCROSE 200 MG: 20 INJECTION, SOLUTION INTRAVENOUS at 08:03

## 2021-03-24 LAB
25(OH)D3+25(OH)D2 SERPL-MCNC: 54 NG/ML (ref 30–96)
T4 FREE SERPL-MCNC: 1.08 NG/DL (ref 0.71–1.51)
TSH SERPL DL<=0.005 MIU/L-ACNC: 2.68 UIU/ML (ref 0.4–4)

## 2021-04-19 ENCOUNTER — PATIENT MESSAGE (OUTPATIENT)
Dept: HEMATOLOGY/ONCOLOGY | Facility: CLINIC | Age: 41
End: 2021-04-19

## 2021-04-28 ENCOUNTER — PATIENT MESSAGE (OUTPATIENT)
Dept: RESEARCH | Facility: HOSPITAL | Age: 41
End: 2021-04-28

## 2021-05-12 ENCOUNTER — OFFICE VISIT (OUTPATIENT)
Dept: HEMATOLOGY/ONCOLOGY | Facility: CLINIC | Age: 41
End: 2021-05-12
Payer: COMMERCIAL

## 2021-05-12 DIAGNOSIS — D50.9 IRON DEFICIENCY ANEMIA, UNSPECIFIED IRON DEFICIENCY ANEMIA TYPE: Primary | ICD-10-CM

## 2021-05-12 PROCEDURE — 99213 PR OFFICE/OUTPT VISIT, EST, LEVL III, 20-29 MIN: ICD-10-PCS | Mod: 95,,, | Performed by: NURSE PRACTITIONER

## 2021-05-12 PROCEDURE — 99213 OFFICE O/P EST LOW 20 MIN: CPT | Mod: 95,,, | Performed by: NURSE PRACTITIONER

## 2021-08-16 ENCOUNTER — PATIENT MESSAGE (OUTPATIENT)
Dept: HEMATOLOGY/ONCOLOGY | Facility: CLINIC | Age: 41
End: 2021-08-16

## 2021-08-20 ENCOUNTER — LAB VISIT (OUTPATIENT)
Dept: LAB | Facility: HOSPITAL | Age: 41
End: 2021-08-20
Attending: NURSE PRACTITIONER
Payer: COMMERCIAL

## 2021-08-20 DIAGNOSIS — D50.9 IRON DEFICIENCY ANEMIA, UNSPECIFIED IRON DEFICIENCY ANEMIA TYPE: ICD-10-CM

## 2021-08-20 DIAGNOSIS — Z98.84 H/O BARIATRIC SURGERY: ICD-10-CM

## 2021-08-20 LAB
ANION GAP SERPL CALC-SCNC: 10 MMOL/L (ref 8–16)
BASOPHILS # BLD AUTO: 0.03 K/UL (ref 0–0.2)
BASOPHILS NFR BLD: 0.8 % (ref 0–1.9)
BUN SERPL-MCNC: 14 MG/DL (ref 6–20)
CALCIUM SERPL-MCNC: 9.2 MG/DL (ref 8.7–10.5)
CHLORIDE SERPL-SCNC: 103 MMOL/L (ref 95–110)
CO2 SERPL-SCNC: 24 MMOL/L (ref 23–29)
CREAT SERPL-MCNC: 0.9 MG/DL (ref 0.5–1.4)
DIFFERENTIAL METHOD: ABNORMAL
EOSINOPHIL # BLD AUTO: 0.1 K/UL (ref 0–0.5)
EOSINOPHIL NFR BLD: 2.6 % (ref 0–8)
ERYTHROCYTE [DISTWIDTH] IN BLOOD BY AUTOMATED COUNT: 13.2 % (ref 11.5–14.5)
EST. GFR  (AFRICAN AMERICAN): >60 ML/MIN/1.73 M^2
EST. GFR  (NON AFRICAN AMERICAN): >60 ML/MIN/1.73 M^2
FERRITIN SERPL-MCNC: 68 NG/ML (ref 20–300)
GLUCOSE SERPL-MCNC: 83 MG/DL (ref 70–110)
HCT VFR BLD AUTO: 40.4 % (ref 37–48.5)
HGB BLD-MCNC: 12.9 G/DL (ref 12–16)
IMM GRANULOCYTES # BLD AUTO: 0 K/UL (ref 0–0.04)
IMM GRANULOCYTES NFR BLD AUTO: 0 % (ref 0–0.5)
IRON SERPL-MCNC: 121 UG/DL (ref 30–160)
LYMPHOCYTES # BLD AUTO: 1.8 K/UL (ref 1–4.8)
LYMPHOCYTES NFR BLD: 47.8 % (ref 18–48)
MCH RBC QN AUTO: 31 PG (ref 27–31)
MCHC RBC AUTO-ENTMCNC: 31.9 G/DL (ref 32–36)
MCV RBC AUTO: 97 FL (ref 82–98)
MONOCYTES # BLD AUTO: 0.4 K/UL (ref 0.3–1)
MONOCYTES NFR BLD: 10.6 % (ref 4–15)
NEUTROPHILS # BLD AUTO: 1.5 K/UL (ref 1.8–7.7)
NEUTROPHILS NFR BLD: 38.2 % (ref 38–73)
NRBC BLD-RTO: 0 /100 WBC
PLATELET # BLD AUTO: 238 K/UL (ref 150–450)
PMV BLD AUTO: 8.6 FL (ref 9.2–12.9)
POTASSIUM SERPL-SCNC: 4.2 MMOL/L (ref 3.5–5.1)
RBC # BLD AUTO: 4.16 M/UL (ref 4–5.4)
SATURATED IRON: 28 % (ref 20–50)
SODIUM SERPL-SCNC: 137 MMOL/L (ref 136–145)
TOTAL IRON BINDING CAPACITY: 438 UG/DL (ref 250–450)
TRANSFERRIN SERPL-MCNC: 296 MG/DL (ref 200–375)
WBC # BLD AUTO: 3.85 K/UL (ref 3.9–12.7)

## 2021-08-20 PROCEDURE — 82728 ASSAY OF FERRITIN: CPT | Performed by: NURSE PRACTITIONER

## 2021-08-20 PROCEDURE — 83540 ASSAY OF IRON: CPT | Performed by: NURSE PRACTITIONER

## 2021-08-20 PROCEDURE — 85025 COMPLETE CBC W/AUTO DIFF WBC: CPT | Performed by: NURSE PRACTITIONER

## 2021-08-20 PROCEDURE — 36415 COLL VENOUS BLD VENIPUNCTURE: CPT | Performed by: NURSE PRACTITIONER

## 2021-08-20 PROCEDURE — 80048 BASIC METABOLIC PNL TOTAL CA: CPT | Performed by: NURSE PRACTITIONER

## 2021-09-04 ENCOUNTER — PATIENT MESSAGE (OUTPATIENT)
Dept: HEMATOLOGY/ONCOLOGY | Facility: CLINIC | Age: 41
End: 2021-09-04

## 2022-08-23 ENCOUNTER — LAB VISIT (OUTPATIENT)
Dept: LAB | Facility: HOSPITAL | Age: 42
End: 2022-08-23
Attending: INTERNAL MEDICINE
Payer: COMMERCIAL

## 2022-08-23 ENCOUNTER — OFFICE VISIT (OUTPATIENT)
Dept: HEMATOLOGY/ONCOLOGY | Facility: CLINIC | Age: 42
End: 2022-08-23
Payer: COMMERCIAL

## 2022-08-23 VITALS
HEART RATE: 83 BPM | OXYGEN SATURATION: 98 % | WEIGHT: 231.69 LBS | HEIGHT: 63 IN | DIASTOLIC BLOOD PRESSURE: 99 MMHG | TEMPERATURE: 97 F | SYSTOLIC BLOOD PRESSURE: 143 MMHG | BODY MASS INDEX: 41.05 KG/M2

## 2022-08-23 DIAGNOSIS — D50.9 IRON DEFICIENCY ANEMIA, UNSPECIFIED IRON DEFICIENCY ANEMIA TYPE: Primary | ICD-10-CM

## 2022-08-23 DIAGNOSIS — D50.9 IRON DEFICIENCY ANEMIA, UNSPECIFIED IRON DEFICIENCY ANEMIA TYPE: ICD-10-CM

## 2022-08-23 DIAGNOSIS — N92.4 EXCESSIVE BLEEDING IN PREMENOPAUSAL PERIOD: ICD-10-CM

## 2022-08-23 DIAGNOSIS — Z98.84 H/O BARIATRIC SURGERY: ICD-10-CM

## 2022-08-23 LAB
BASOPHILS # BLD AUTO: 0.03 K/UL (ref 0–0.2)
BASOPHILS NFR BLD: 0.8 % (ref 0–1.9)
DIFFERENTIAL METHOD: ABNORMAL
EOSINOPHIL # BLD AUTO: 0.1 K/UL (ref 0–0.5)
EOSINOPHIL NFR BLD: 2.1 % (ref 0–8)
ERYTHROCYTE [DISTWIDTH] IN BLOOD BY AUTOMATED COUNT: 13.6 % (ref 11.5–14.5)
FERRITIN SERPL-MCNC: 30 NG/ML (ref 20–300)
HCT VFR BLD AUTO: 41 % (ref 37–48.5)
HGB BLD-MCNC: 13.2 G/DL (ref 12–16)
IMM GRANULOCYTES # BLD AUTO: 0.01 K/UL (ref 0–0.04)
IMM GRANULOCYTES NFR BLD AUTO: 0.3 % (ref 0–0.5)
IRON SERPL-MCNC: 97 UG/DL (ref 30–160)
LYMPHOCYTES # BLD AUTO: 1.4 K/UL (ref 1–4.8)
LYMPHOCYTES NFR BLD: 37.4 % (ref 18–48)
MCH RBC QN AUTO: 31.7 PG (ref 27–31)
MCHC RBC AUTO-ENTMCNC: 32.2 G/DL (ref 32–36)
MCV RBC AUTO: 99 FL (ref 82–98)
MONOCYTES # BLD AUTO: 0.3 K/UL (ref 0.3–1)
MONOCYTES NFR BLD: 7.5 % (ref 4–15)
NEUTROPHILS # BLD AUTO: 1.9 K/UL (ref 1.8–7.7)
NEUTROPHILS NFR BLD: 51.9 % (ref 38–73)
NRBC BLD-RTO: 0 /100 WBC
PLATELET # BLD AUTO: 266 K/UL (ref 150–450)
PMV BLD AUTO: 8.8 FL (ref 9.2–12.9)
RBC # BLD AUTO: 4.16 M/UL (ref 4–5.4)
SATURATED IRON: 20 % (ref 20–50)
TOTAL IRON BINDING CAPACITY: 491 UG/DL (ref 250–450)
TRANSFERRIN SERPL-MCNC: 332 MG/DL (ref 200–375)
WBC # BLD AUTO: 3.74 K/UL (ref 3.9–12.7)

## 2022-08-23 PROCEDURE — 99214 OFFICE O/P EST MOD 30 MIN: CPT | Mod: S$GLB,,, | Performed by: INTERNAL MEDICINE

## 2022-08-23 PROCEDURE — 99999 PR PBB SHADOW E&M-EST. PATIENT-LVL V: CPT | Mod: PBBFAC,,, | Performed by: INTERNAL MEDICINE

## 2022-08-23 PROCEDURE — 36415 COLL VENOUS BLD VENIPUNCTURE: CPT | Performed by: INTERNAL MEDICINE

## 2022-08-23 PROCEDURE — 84466 ASSAY OF TRANSFERRIN: CPT | Performed by: INTERNAL MEDICINE

## 2022-08-23 PROCEDURE — 99999 PR PBB SHADOW E&M-EST. PATIENT-LVL V: ICD-10-PCS | Mod: PBBFAC,,, | Performed by: INTERNAL MEDICINE

## 2022-08-23 PROCEDURE — 82728 ASSAY OF FERRITIN: CPT | Performed by: INTERNAL MEDICINE

## 2022-08-23 PROCEDURE — 85025 COMPLETE CBC W/AUTO DIFF WBC: CPT | Performed by: INTERNAL MEDICINE

## 2022-08-23 PROCEDURE — 99214 PR OFFICE/OUTPT VISIT, EST, LEVL IV, 30-39 MIN: ICD-10-PCS | Mod: S$GLB,,, | Performed by: INTERNAL MEDICINE

## 2022-08-23 NOTE — PROGRESS NOTES
Subjective:      DATE OF VISIT: 8/23/22     ?  Patient ID:?Marcia Hazel is a 42 y.o. female.?? MR#: [id number]   ?   REFERRING PROVIDER: Ellie Self  No address on file     ? Primary Care Providers:  Marcia Calix MD, MD (General)     CHIEF COMPLAINT: ?  Iron deficiency anemia??   ?   HPI    She follows up today referred back from primary care due to low ferritin.  November 2021 she had gastric bypass surgery (prior sleeve).  She did have IV iron therapy last in March 2021 and has been on oral iron since labs showing decline in ferritin January 2022.  Repeat ferritin by primary care without improvement in ferritin.  She denies any change in energy level.  She continues to have menorrhagia.  No rectal bleeding melena or hematochezia or other abnormal bleeding aside from menorrhagia.  She did have upper endoscopy prior to gastric bypass surgery.  She is never had colonoscopy.    Review of Systems    ?   A comprehensive 14-point review of systems was reviewed with patient and was negative other than as specified above.   ?   PAST MEDICAL HISTORY:   Past Medical History:   Diagnosis Date    Acquired hypothyroidism     Benign hypertension     Dysmetabolic syndrome X 11/28/2016 3:39:32 PM    [facility] Historical - DO NOT USE: Insulin resistance-No Additional Notes    Encounter for general adult medical examination without abnormal findings 04/10/2017    Essential hypertension 11/28/2016 3:39:24 PM    [facility] Historical - Cardiovascular: Hypertension-No Additional Notes    GERD (gastroesophageal reflux disease)     Hyperlipidemia     Hyperlipidemia     Hypothyroidism 11/28/2016 3:39:29 PM    [facility] Historical - DO NOT USE: Hypothyroidism-No Additional Notes    Iron deficiency anemia     Iron deficiency anemia 1/14/2021 9:03:48 AM    [facility] Historical - Unknown: Iron deficiency-No Additional Notes    Metabolic syndrome X     ANTONIA on CPAP     Postsurgical malabsorption      ?     PAST SURGICAL HISTORY:   Past Surgical History:   Procedure Laterality Date    BELT ABDOMINOPLASTY      BUNIONECTOMY Right     GASTROSTOMY      partial    HERNIA REPAIR      MYOMECTOMY        ?   ALLERGIES:   Allergies as of 08/23/2022 - Reviewed 08/23/2022   Allergen Reaction Noted    Lisinopril Swelling 02/22/2017      ?   MEDICATIONS:?   Outpatient Medications Marked as Taking for the 8/23/22 encounter (Office Visit) with Desire Villa MD   Medication Sig Dispense Refill    ascorbic acid, vitamin C, (VITAMIN C) 100 MG tablet Take 100 mg by mouth once daily.      carvediloL (COREG) 12.5 MG tablet TAKE 1 TABLET BY MOUTH 2 TIMES DAILY. 60 tablet 5    cyclobenzaprine (FLEXERIL) 10 MG tablet Take 1 tablet (10 mg total) by mouth 3 (three) times daily as needed. 30 tablet 2    docusate sodium (COLACE) 100 MG capsule Take 100 mg by mouth 2 (two) times daily.      drospirenone, contraceptive, (SLYND) 4 mg (28) Tab Take 4 mg by mouth.      ergocalciferol (ERGOCALCIFEROL) 50,000 unit Cap TK 1 PO ONCE A WEEK 5 capsule 3    ferrous sulfate (FEOSOL) 325 mg (65 mg iron) Tab tablet Take 1 tablet (325 mg total) by mouth daily with breakfast. 90 tablet 1    hydroCHLOROthiazide (HYDRODIURIL) 25 MG tablet TAKE 1 TABLET BY MOUTH EVERY DAY 30 tablet 5    hydrocodone-acetaminophen (HYCET) solution 7.5-325 mg/15mL TK 15 ML PO Q 6 H PRN P 118 mL 0    itraconazole (SPORANOX) 100 mg Cap       ketoconazole (NIZORAL) 2 % cream       ketoconazole (NIZORAL) 2 % shampoo       levothyroxine (SYNTHROID) 137 MCG Tab tablet Take 1 tablet (137 mcg total) by mouth once daily at 6am. 30 tablet 3    metFORMIN (GLUCOPHAGE-XR) 500 MG 24 hr tablet 2 tablets twice daily  11    multivitamin capsule Take 1 capsule by mouth once daily.      ondansetron (ZOFRAN-ODT) 8 MG TbDL TK 1 T PO Q 8 H PRN  FOR  NAUSEA      oxyCODONE-acetaminophen (PERCOCET) 5-325 mg per tablet TAKE 1 TO 2 TABLETS BY MOUTH EVERY 4 TO 6 HOURS PRN P  0     pantoprazole (PROTONIX) 40 MG tablet TAKE 1 TABLET BY MOUTH TWICE A DAY 60 tablet 2    valACYclovir (VALTREX) 1000 MG tablet TK 1 T PO ONCE D  2    zinc sulfate (ZINC-15 ORAL) Take by mouth.        ?   SOCIAL HISTORY:?   Social History     Tobacco Use    Smoking status: Never Smoker    Smokeless tobacco: Never Used   Substance Use Topics    Alcohol use: Yes     Comment: social      ?      ?   FAMILY HISTORY:   family history includes Diabetes in her mother; Hypertension in her mother.   ?        Objective:      Physical Exam      ?   Vitals:    08/23/22 1146   BP: (!) 143/99   Pulse: 83   Temp: 97.1 °F (36.2 °C)      ?ECOG:?0   General appearance: Generally well appearing, in no acute distress.   Head, eyes, ears, nose, and throat: moist mucous membranes.   Respiratory:  Normal work of breathing  Abdomen: nontender, nondistended.   Extremities: Warm, without edema.   Neurologic: Alert and oriented. Grossly normal strength, coordination, and gait.   Skin: No rashes, ecchymoses or petechial lesion.   Psychiatric:  Normal mood and affect.    ?   Laboratory:  ?   Lab Visit on 08/23/2022   Component Date Value Ref Range Status    WBC 08/23/2022 3.74 (A) 3.90 - 12.70 K/uL Final    RBC 08/23/2022 4.16  4.00 - 5.40 M/uL Final    Hemoglobin 08/23/2022 13.2  12.0 - 16.0 g/dL Final    Hematocrit 08/23/2022 41.0  37.0 - 48.5 % Final    MCV 08/23/2022 99 (A) 82 - 98 fL Final    MCH 08/23/2022 31.7 (A) 27.0 - 31.0 pg Final    MCHC 08/23/2022 32.2  32.0 - 36.0 g/dL Final    RDW 08/23/2022 13.6  11.5 - 14.5 % Final    Platelets 08/23/2022 266  150 - 450 K/uL Final    MPV 08/23/2022 8.8 (A) 9.2 - 12.9 fL Final    Immature Granulocytes 08/23/2022 0.3  0.0 - 0.5 % Final    Gran # (ANC) 08/23/2022 1.9  1.8 - 7.7 K/uL Final    Immature Grans (Abs) 08/23/2022 0.01  0.00 - 0.04 K/uL Final    Lymph # 08/23/2022 1.4  1.0 - 4.8 K/uL Final    Mono # 08/23/2022 0.3  0.3 - 1.0 K/uL Final    Eos # 08/23/2022 0.1  0.0 -  0.5 K/uL Final    Baso # 08/23/2022 0.03  0.00 - 0.20 K/uL Final    nRBC 08/23/2022 0  0 /100 WBC Final    Gran % 08/23/2022 51.9  38.0 - 73.0 % Final    Lymph % 08/23/2022 37.4  18.0 - 48.0 % Final    Mono % 08/23/2022 7.5  4.0 - 15.0 % Final    Eosinophil % 08/23/2022 2.1  0.0 - 8.0 % Final    Basophil % 08/23/2022 0.8  0.0 - 1.9 % Final    Differential Method 08/23/2022 Automated   Final          ?   Assessment/Plan:       1. Iron deficiency anemia, unspecified iron deficiency anemia type          Plan:     # anemia, iron deficiency:  May be multifactorial with menorrhagia and possible malabsorption with history of gastric sleeve -> bypass surgery November 2021, hiatal hernia and GERD.  Repeat ferritin downtrend will get full set of iron indices and repeat CBC. May require additional IV iron therapy due to likely malabsorptive (she is been on oral po iron daily since 1-8/2022 without improvement in ferritin and continued menorrhagia).  She has had recent upper endoscopy she says with her surgery in November but has not had colonoscopy.  No signs or symptoms of other concerning bleeding aside from menorrhagia and did recommend complete GI evaluation with lower endoscopy if persistent anemia with resolution of menorrhagia.    # leukopenia: Labs reviewed from 2018 to 2020 show low normal white blood cell count with normal differential.  Will repeat CBC and obtain peripheral blood smear for review of morphology.  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  No concerning history of constitutional symptoms or recurrent infections.    # history of DVT right lower extremity:  She notes history of DVT right calf in August 2019 the setting of immobility with recent orthopedic surgery completed about 6 months of Eliquis.  Discussed oral contraceptives especially ask and estrogen containing can increase risk of thrombosis and would try to avoid given prior history of thrombosis  although does appear to have been provoked.  She expressed understanding will discuss with her gynecologist.    Follow-Up:   Patient Instructions   Labs today

## 2023-02-27 DIAGNOSIS — D50.9 IRON DEFICIENCY ANEMIA, UNSPECIFIED IRON DEFICIENCY ANEMIA TYPE: Primary | ICD-10-CM

## 2023-03-01 ENCOUNTER — LAB VISIT (OUTPATIENT)
Dept: LAB | Facility: HOSPITAL | Age: 43
End: 2023-03-01
Attending: NURSE PRACTITIONER
Payer: COMMERCIAL

## 2023-03-01 DIAGNOSIS — D50.9 IRON DEFICIENCY ANEMIA, UNSPECIFIED IRON DEFICIENCY ANEMIA TYPE: ICD-10-CM

## 2023-03-01 LAB
BASOPHILS # BLD AUTO: 0.05 K/UL (ref 0–0.2)
BASOPHILS NFR BLD: 0.9 % (ref 0–1.9)
DIFFERENTIAL METHOD: ABNORMAL
EOSINOPHIL # BLD AUTO: 0.1 K/UL (ref 0–0.5)
EOSINOPHIL NFR BLD: 1.5 % (ref 0–8)
ERYTHROCYTE [DISTWIDTH] IN BLOOD BY AUTOMATED COUNT: 13.5 % (ref 11.5–14.5)
HCT VFR BLD AUTO: 37.3 % (ref 37–48.5)
HGB BLD-MCNC: 12.4 G/DL (ref 12–16)
IMM GRANULOCYTES # BLD AUTO: 0.02 K/UL (ref 0–0.04)
IMM GRANULOCYTES NFR BLD AUTO: 0.4 % (ref 0–0.5)
LYMPHOCYTES # BLD AUTO: 1.7 K/UL (ref 1–4.8)
LYMPHOCYTES NFR BLD: 32.7 % (ref 18–48)
MCH RBC QN AUTO: 30.2 PG (ref 27–31)
MCHC RBC AUTO-ENTMCNC: 33.2 G/DL (ref 32–36)
MCV RBC AUTO: 91 FL (ref 82–98)
MONOCYTES # BLD AUTO: 0.6 K/UL (ref 0.3–1)
MONOCYTES NFR BLD: 10.3 % (ref 4–15)
NEUTROPHILS # BLD AUTO: 2.9 K/UL (ref 1.8–7.7)
NEUTROPHILS NFR BLD: 54.2 % (ref 38–73)
NRBC BLD-RTO: 0 /100 WBC
PLATELET # BLD AUTO: 290 K/UL (ref 150–450)
PMV BLD AUTO: 8.8 FL (ref 9.2–12.9)
RBC # BLD AUTO: 4.1 M/UL (ref 4–5.4)
WBC # BLD AUTO: 5.32 K/UL (ref 3.9–12.7)

## 2023-03-01 PROCEDURE — 82728 ASSAY OF FERRITIN: CPT | Performed by: NURSE PRACTITIONER

## 2023-03-01 PROCEDURE — 85025 COMPLETE CBC W/AUTO DIFF WBC: CPT | Performed by: NURSE PRACTITIONER

## 2023-03-01 PROCEDURE — 84466 ASSAY OF TRANSFERRIN: CPT | Performed by: NURSE PRACTITIONER

## 2023-03-01 PROCEDURE — 36415 COLL VENOUS BLD VENIPUNCTURE: CPT | Performed by: NURSE PRACTITIONER

## 2023-03-02 LAB
FERRITIN SERPL-MCNC: 15 NG/ML (ref 20–300)
IRON SERPL-MCNC: 52 UG/DL (ref 30–160)
SATURATED IRON: 10 % (ref 20–50)
TOTAL IRON BINDING CAPACITY: 519 UG/DL (ref 250–450)
TRANSFERRIN SERPL-MCNC: 351 MG/DL (ref 200–375)

## 2023-03-06 ENCOUNTER — OFFICE VISIT (OUTPATIENT)
Dept: HEMATOLOGY/ONCOLOGY | Facility: CLINIC | Age: 43
End: 2023-03-06
Payer: COMMERCIAL

## 2023-03-06 DIAGNOSIS — D50.8 OTHER IRON DEFICIENCY ANEMIA: ICD-10-CM

## 2023-03-06 PROCEDURE — 99214 OFFICE O/P EST MOD 30 MIN: CPT | Mod: 95,,, | Performed by: NURSE PRACTITIONER

## 2023-03-06 PROCEDURE — 99214 PR OFFICE/OUTPT VISIT, EST, LEVL IV, 30-39 MIN: ICD-10-PCS | Mod: 95,,, | Performed by: NURSE PRACTITIONER

## 2023-03-06 RX ORDER — SODIUM CHLORIDE 0.9 % (FLUSH) 0.9 %
10 SYRINGE (ML) INJECTION
Status: CANCELLED | OUTPATIENT
Start: 2023-03-06

## 2023-03-06 RX ORDER — HEPARIN 100 UNIT/ML
500 SYRINGE INTRAVENOUS
Status: CANCELLED | OUTPATIENT
Start: 2023-03-06

## 2023-03-06 RX ORDER — HEPARIN 100 UNIT/ML
500 SYRINGE INTRAVENOUS
Status: CANCELLED | OUTPATIENT
Start: 2023-03-13

## 2023-03-06 RX ORDER — SODIUM CHLORIDE 0.9 % (FLUSH) 0.9 %
10 SYRINGE (ML) INJECTION
Status: CANCELLED | OUTPATIENT
Start: 2023-03-13

## 2023-03-06 NOTE — PROGRESS NOTES
Subjective:       Patient ID: Marcia Hazel is a 42 y.o. female.    Chief Complaint: f/u h/o JANET    HPI: 42-year-old woman with history of obesity status post gastric sleeve 2016 with hiatal hernia and GERD.  She also has history of menorrhagia and has been following with Gynecology.  She notes prior use of estrogen containing oral contraceptive and has been off since May. She does note history of right lower extremity DVT in calf in the setting of immobility after a surgery and completed about 6 months of Eliquis.     She had been followed by Dr. Beckford, outside hematologist/oncologist for history of iron deficiency anemia.  She notes in the past receiving 8 doses of IV iron and more recently in August receive 4 more doses of IV iron therapy with subsequent lab work showing mild anemia hemoglobin 11, normal saturation, TIBC, transferrin.  No ferritin checked.  Patient also notes her outside oncologist had concerns about white blood cell count however per these labs within normal limits and normal differential.     She has mammogram for breast cancer screening which she reports is benign.  She has never had colonoscopy.  No family history of malignancy.    Today's visit:  Patient presenting today for follow up of her JANET. Notes increasing fatigue. Denies other anemia symptoms or abnormal bleeding.     The patient location is: car  The chief complaint leading to consultation is: f/u JANET    Visit type: audiovisual    Face to Face time with patient: 10 minutes  25 minutes of total time spent on the encounter, which includes face to face time and non-face to face time preparing to see the patient (eg, review of tests), Obtaining and/or reviewing separately obtained history, Documenting clinical information in the electronic or other health record, Independently interpreting results (not separately reported) and communicating results to the patient/family/caregiver, or Care coordination (not separately reported).          Each patient to whom he or she provides medical services by telemedicine is:  (1) informed of the relationship between the physician and patient and the respective role of any other health care provider with respect to management of the patient; and (2) notified that he or she may decline to receive medical services by telemedicine and may withdraw from such care at any time.    Notes:        Social History     Socioeconomic History    Marital status: Single   Tobacco Use    Smoking status: Never    Smokeless tobacco: Never   Substance and Sexual Activity    Alcohol use: Yes     Comment: social    Drug use: No    Sexual activity: Yes       Past Medical History:   Diagnosis Date    Acquired hypothyroidism     Benign hypertension     Deep vein thrombosis 08/2019    right leg    Dysmetabolic syndrome X 11/28/2016 3:39:32 PM    [employer] Historical - DO NOT USE: Insulin resistance-No Additional Notes    Encounter for general adult medical examination without abnormal findings 04/10/2017    Essential hypertension 11/28/2016 3:39:24 PM    [employer] Historical - Cardiovascular: Hypertension-No Additional Notes    GERD (gastroesophageal reflux disease)     Hyperlipidemia     Hyperlipidemia     Hypothyroidism 11/28/2016 3:39:29 PM    [employer] Historical - DO NOT USE: Hypothyroidism-No Additional Notes    Iron deficiency anemia     Iron deficiency anemia 1/14/2021 9:03:48 AM    [employer] Historical - Unknown: Iron deficiency-No Additional Notes    Metabolic syndrome X     ANTONIA on CPAP     Postsurgical malabsorption        Family History   Problem Relation Age of Onset    Diabetes Mother     Hypertension Mother        Past Surgical History:   Procedure Laterality Date    BELT ABDOMINOPLASTY      BUNIONECTOMY Right     GASTROSTOMY      partial    HERNIA REPAIR      MYOMECTOMY         Review of Systems   Constitutional:  Positive for fatigue. Negative for activity change, appetite  change, chills, fever and unexpected weight change.   HENT:  Negative for congestion, mouth sores, nosebleeds, sore throat, trouble swallowing and voice change.    Eyes:  Negative for photophobia.   Respiratory:  Negative for cough, chest tightness, shortness of breath and wheezing.    Cardiovascular:  Negative for chest pain and leg swelling.   Gastrointestinal:  Negative for abdominal distention, abdominal pain, blood in stool, constipation, diarrhea, nausea and vomiting.   Genitourinary:  Negative for difficulty urinating, dysuria and hematuria.   Musculoskeletal:  Negative for arthralgias, back pain and myalgias.   Skin:  Negative for pallor, rash and wound.   Neurological:  Negative for dizziness, syncope, weakness and headaches.   Hematological:  Negative for adenopathy. Does not bruise/bleed easily.   Psychiatric/Behavioral:  The patient is not nervous/anxious.        Medication List with Changes/Refills   Current Medications    ASCORBIC ACID, VITAMIN C, (VITAMIN C) 100 MG TABLET    Take 100 mg by mouth once daily.    CARVEDILOL (COREG) 12.5 MG TABLET    TAKE 1 TABLET BY MOUTH TWICE A DAY    CHLORHEXIDINE (PERIDEX) 0.12 % SOLUTION    BRUSH AND FLOSS TEETH. SWISH 15ML FOR 30 SECONDS THEN EXPECTORATE USE TWICE DAILY    CYCLOBENZAPRINE (FLEXERIL) 10 MG TABLET    TAKE 1 TABLET BY MOUTH THREE TIMES A DAY AS NEEDED    DOCUSATE SODIUM (COLACE) 100 MG CAPSULE    Take 100 mg by mouth 2 (two) times daily.    DROSPIRENONE, CONTRACEPTIVE, (SLYND) 4 MG (28) TAB    Take 4 mg by mouth.    ERGOCALCIFEROL (ERGOCALCIFEROL) 50,000 UNIT CAP    TAKE 1 CAPSULE BY MOUTH WEEKLY    FERROUS SULFATE (FEOSOL) 325 MG (65 MG IRON) TAB TABLET    Take 1 tablet (325 mg total) by mouth daily with breakfast.    FUROSEMIDE (LASIX) 20 MG TABLET    Take 20 mg by mouth daily as needed.    HYDROCHLOROTHIAZIDE (HYDRODIURIL) 25 MG TABLET    TAKE 1 TABLET BY MOUTH EVERY DAY    HYDROCODONE-ACETAMINOPHEN (HYCET) SOLUTION 7.5-325 MG/15ML    TK 15 ML PO Q  6 H PRN P    ITRACONAZOLE (SPORANOX) 100 MG CAP        KETOCONAZOLE (NIZORAL) 2 % CREAM        KETOCONAZOLE (NIZORAL) 2 % SHAMPOO        LEVOTHYROXINE (SYNTHROID) 137 MCG TAB TABLET    Take 1 tablet (137 mcg total) by mouth once daily at 6am.    METFORMIN (GLUCOPHAGE-XR) 500 MG 24 HR TABLET    2 tablets twice daily    METFORMIN (GLUCOPHAGE-XR) 500 MG ER 24HR TABLET    Take 4 tablets by mouth once daily.    MULTIVITAMIN CAPSULE    Take 1 capsule by mouth once daily.    ONDANSETRON (ZOFRAN-ODT) 8 MG TBDL    TK 1 T PO Q 8 H PRN  FOR  NAUSEA    OXYCODONE-ACETAMINOPHEN (PERCOCET) 5-325 MG PER TABLET    TAKE 1 TO 2 TABLETS BY MOUTH EVERY 4 TO 6 HOURS PRN P    PANTOPRAZOLE (PROTONIX) 40 MG TABLET    TAKE 1 TABLET BY MOUTH TWICE A DAY    VALACYCLOVIR (VALTREX) 1000 MG TABLET    TK 1 T PO ONCE D    ZINC SULFATE (ZINC-15 ORAL)    Take by mouth.     Objective:   There were no vitals filed for this visit.  Lab Results   Component Value Date    WBC 5.32 03/01/2023    HGB 12.4 03/01/2023    HCT 37.3 03/01/2023    MCV 91 03/01/2023     03/01/2023       BMP  Lab Results   Component Value Date     08/03/2022    K 4.2 08/03/2022    CL 96 08/03/2022    CO2 27 08/03/2022    BUN 7 08/03/2022    CREATININE 0.80 08/03/2022    CALCIUM 9.3 08/03/2022    ANIONGAP 10 08/20/2021    ESTGFRAFRICA >60 08/20/2021    EGFRNONAA 87 01/31/2022     Lab Results   Component Value Date    ALT 16 08/03/2022    AST 21 08/03/2022    GGT 46 11/15/2018    ALKPHOS 52 07/17/2020    BILITOT 0.4 08/03/2022       Physical Exam  Pulmonary:      Effort: Pulmonary effort is normal. No respiratory distress.   Neurological:      Mental Status: She is alert and oriented to person, place, and time.        Assessment:     Problem List Items Addressed This Visit          Oncology    Iron deficiency anemia     Recurrent iron deficiency. Interval decline in hemoglobin, low normal. She reports ongoing menorrhagia with GYN follow up planned    Arrange Feraheme  weekly x 2. F/u 3 months with cbc, iron, ferritin              Plan:     Other iron deficiency anemia      Med Onc Chart Routing      Follow up with physician    Follow up with [facility] 3 months.   Infusion scheduling note feraheme weekly x 2   Injection scheduling note    Labs CBC, ferritin and iron and TIBC   Lab interval:  1-2 days prior to appt   Imaging None      Pharmacy appointment No pharmacy appointment needed      Other referrals No additional referrals needed             Palma Mathew, ERICHP-C

## 2023-03-06 NOTE — ASSESSMENT & PLAN NOTE
Recurrent iron deficiency. Interval decline in hemoglobin, low normal. She reports ongoing menorrhagia with GYN follow up planned    Arrange Feraheme weekly x 2. F/u 3 months with cbc, iron, ferritin

## 2023-03-23 ENCOUNTER — TELEPHONE (OUTPATIENT)
Dept: INFUSION THERAPY | Facility: HOSPITAL | Age: 43
End: 2023-03-23
Payer: COMMERCIAL

## 2023-03-23 NOTE — TELEPHONE ENCOUNTER
"Spoke with patient and informed her of her insurance decision to not pay for her infusion via Medical Buy and Bill. Infusion notified.     Gayatri Chase LPN   [facility] Staff; JONI Ruiz and Good afternoon:     I called  [facility] [employer] INSURANCE- spoke with (REP) Tucker MAYFIELD 03/17/2023. According to (REP) they do not cover "Medical Buy & Bill" which is how we bill for infusions. Patient did have medical benefits coverage and we are in -network.     Patient did finally call me back and per patient this is the only insurance she has presently. Will send this to the MAC team as well to see if they can process it, but we can not. Please inform patient.       Thank you and take care,   Gayatri TERRAZAS LPN   Pre-Service Dept       Appt : 03/24/2023   "

## 2023-04-03 ENCOUNTER — PATIENT MESSAGE (OUTPATIENT)
Dept: HEMATOLOGY/ONCOLOGY | Facility: CLINIC | Age: 43
End: 2023-04-03
Payer: COMMERCIAL

## 2023-04-05 ENCOUNTER — PATIENT MESSAGE (OUTPATIENT)
Dept: HEMATOLOGY/ONCOLOGY | Facility: CLINIC | Age: 43
End: 2023-04-05
Payer: COMMERCIAL

## 2023-04-05 ENCOUNTER — TELEPHONE (OUTPATIENT)
Dept: HEMATOLOGY/ONCOLOGY | Facility: CLINIC | Age: 43
End: 2023-04-05
Payer: COMMERCIAL

## 2023-04-05 RX ORDER — SODIUM CHLORIDE 0.9 % (FLUSH) 0.9 %
10 SYRINGE (ML) INJECTION
Status: CANCELLED | OUTPATIENT
Start: 2023-05-03

## 2023-04-05 RX ORDER — SODIUM CHLORIDE 0.9 % (FLUSH) 0.9 %
10 SYRINGE (ML) INJECTION
Status: CANCELLED | OUTPATIENT
Start: 2023-04-26

## 2023-04-05 RX ORDER — SODIUM CHLORIDE 0.9 % (FLUSH) 0.9 %
10 SYRINGE (ML) INJECTION
Status: CANCELLED | OUTPATIENT
Start: 2023-04-12

## 2023-04-05 RX ORDER — SODIUM CHLORIDE 0.9 % (FLUSH) 0.9 %
10 SYRINGE (ML) INJECTION
Status: CANCELLED | OUTPATIENT
Start: 2023-04-19

## 2023-04-05 RX ORDER — SODIUM CHLORIDE 0.9 % (FLUSH) 0.9 %
10 SYRINGE (ML) INJECTION
Status: CANCELLED | OUTPATIENT
Start: 2023-04-05

## 2023-04-05 RX ORDER — HEPARIN 100 UNIT/ML
500 SYRINGE INTRAVENOUS
Status: CANCELLED | OUTPATIENT
Start: 2023-05-03

## 2023-04-05 RX ORDER — HEPARIN 100 UNIT/ML
500 SYRINGE INTRAVENOUS
Status: CANCELLED | OUTPATIENT
Start: 2023-04-12

## 2023-04-05 RX ORDER — HEPARIN 100 UNIT/ML
500 SYRINGE INTRAVENOUS
Status: CANCELLED | OUTPATIENT
Start: 2023-05-17

## 2023-04-05 RX ORDER — HEPARIN 100 UNIT/ML
500 SYRINGE INTRAVENOUS
Status: CANCELLED | OUTPATIENT
Start: 2023-05-10

## 2023-04-05 RX ORDER — SODIUM CHLORIDE 0.9 % (FLUSH) 0.9 %
10 SYRINGE (ML) INJECTION
Status: CANCELLED | OUTPATIENT
Start: 2023-05-17

## 2023-04-05 RX ORDER — HEPARIN 100 UNIT/ML
500 SYRINGE INTRAVENOUS
Status: CANCELLED | OUTPATIENT
Start: 2023-04-19

## 2023-04-05 RX ORDER — SODIUM CHLORIDE 0.9 % (FLUSH) 0.9 %
10 SYRINGE (ML) INJECTION
Status: CANCELLED | OUTPATIENT
Start: 2023-05-24

## 2023-04-05 RX ORDER — SODIUM CHLORIDE 0.9 % (FLUSH) 0.9 %
10 SYRINGE (ML) INJECTION
Status: CANCELLED | OUTPATIENT
Start: 2023-05-10

## 2023-04-05 RX ORDER — HEPARIN 100 UNIT/ML
500 SYRINGE INTRAVENOUS
Status: CANCELLED | OUTPATIENT
Start: 2023-04-05

## 2023-04-05 RX ORDER — HEPARIN 100 UNIT/ML
500 SYRINGE INTRAVENOUS
Status: CANCELLED | OUTPATIENT
Start: 2023-04-26

## 2023-04-05 RX ORDER — HEPARIN 100 UNIT/ML
500 SYRINGE INTRAVENOUS
Status: CANCELLED | OUTPATIENT
Start: 2023-05-24

## 2023-04-05 NOTE — TELEPHONE ENCOUNTER
----- Message from Palma Mathew NP sent at 4/5/2023  2:33 PM CDT -----  Will you please reach out to patient and let her know we can do alternate IV iron Venofer if her insurance approves. This however requires 5 weekly doses instead of 2 weekly doses of Injectafer. If she agrees please schedule accordingly

## 2023-04-05 NOTE — TELEPHONE ENCOUNTER
I called the pt to give her the message from . but pt was not available and I left her a voicemail, and also send her a copy of 's message to her my chart portal, and requested her on the voicemail to look for the message in her portal.

## 2023-04-06 ENCOUNTER — TELEPHONE (OUTPATIENT)
Dept: INFUSION THERAPY | Facility: HOSPITAL | Age: 43
End: 2023-04-06
Payer: COMMERCIAL

## 2023-04-06 ENCOUNTER — TELEPHONE (OUTPATIENT)
Dept: HEMATOLOGY/ONCOLOGY | Facility: CLINIC | Age: 43
End: 2023-04-06
Payer: COMMERCIAL

## 2023-04-12 PROBLEM — Z98.890 S/P MYOMECTOMY: Status: ACTIVE | Noted: 2023-04-12

## 2023-04-12 PROBLEM — K21.00 GASTRO-ESOPHAGEAL REFLUX DISEASE WITH ESOPHAGITIS: Status: ACTIVE | Noted: 2021-03-25

## 2023-04-12 PROBLEM — E88.810 METABOLIC SYNDROME X: Status: ACTIVE | Noted: 2021-03-25

## 2023-04-14 ENCOUNTER — TELEPHONE (OUTPATIENT)
Dept: INFUSION THERAPY | Facility: HOSPITAL | Age: 43
End: 2023-04-14
Payer: COMMERCIAL

## 2023-04-17 ENCOUNTER — TELEPHONE (OUTPATIENT)
Dept: HEMATOLOGY/ONCOLOGY | Facility: CLINIC | Age: 43
End: 2023-04-17
Payer: COMMERCIAL

## 2023-04-17 DIAGNOSIS — D50.9 IRON DEFICIENCY ANEMIA, UNSPECIFIED IRON DEFICIENCY ANEMIA TYPE: Primary | ICD-10-CM

## 2023-04-25 ENCOUNTER — TELEPHONE (OUTPATIENT)
Dept: INFUSION THERAPY | Facility: HOSPITAL | Age: 43
End: 2023-04-25
Payer: COMMERCIAL

## 2023-04-25 DIAGNOSIS — D50.9 IRON DEFICIENCY ANEMIA, UNSPECIFIED IRON DEFICIENCY ANEMIA TYPE: ICD-10-CM

## 2023-04-25 NOTE — TELEPHONE ENCOUNTER
Good Morning,     Due to Ms. Hazel needing to have her medication administered via Infusion and submitted through Pharmacy benefits, her RX will need to be sent to [facility] for processing. Just spoke with the Pharmacist at [facility].     Thanks,     Sera

## 2023-04-25 NOTE — TELEPHONE ENCOUNTER
Spoke with Zehra from [facility] regarding Ms. Hazle's Venofer IV medication. Zehra stated that she does have the RX for Ms. Forrests Venofer however she tried to run the pharmacy benefits but she is getting a kick back from the insurance company stating that her  is incorrect.     It was also stated that her Supervisor has not stated whether or not they are able to dispense Venofer.     This morning at 1052 I spoke with an [facility] tech who stated that the request to fill Venofer and mail it to [facility] for Ms. Hazel's 23 appointment is being escalated to the [facility] Pharmacy Supervisors. The tech stated that she is being told that this particular medication will have to be Buy and Bill and it may not be a drug that can be dispensed. I did mention that it would not go to the patient but be shipped to [facility] and gave them Pablo's name. They will create a group message to discuss and I'll add you to it. At this time I was not notified of a decision which initiated the second call above with MsOlya Zehra.     Left a message with Ms. Hazel to let her know that she will need to contact her insurance provider to update her  in order for [facility] to run Pharmacy benefits. I also informed her that she will not be able to get receive her infusion tomorrow as this will take time to process.

## 2023-04-25 NOTE — TELEPHONE ENCOUNTER
Spoke with Ms. Hazel who states that she has attempted several times to update her  with her insurance provider and they still have not updated it. The  to enter is .. She states she has received my voicemail regarding tomorrow's appt needing to be rescheduled. Will continue to monitor.

## 2023-04-26 ENCOUNTER — TELEPHONE (OUTPATIENT)
Dept: PHARMACY | Facility: CLINIC | Age: 43
End: 2023-04-26
Payer: COMMERCIAL

## 2023-04-26 NOTE — TELEPHONE ENCOUNTER
Jose Elias, this is ALFIE RAMOS, clinical pharmacist with [facility] that is part of your care team.  We have begun working on your prescription that your doctor has sent us. Our next steps include:     Working with your insurance company to obtain approval for your medication  Working with you to ensure your medication is affordable     We will be calling you along the way with updates on your medication but if you have any concerns or receive information that you would like to discuss please reach us at [phone].    Welcome call outcome: Left voicemail

## 2023-04-27 DIAGNOSIS — D50.9 IRON DEFICIENCY ANEMIA, UNSPECIFIED IRON DEFICIENCY ANEMIA TYPE: ICD-10-CM

## 2023-04-27 NOTE — TELEPHONE ENCOUNTER
Outgoing call to pt to inform her that Venofer is not covered under pharmacy benefits. Patient expressed understanding. I have messaged her provider to inform her patient's medication must be processed via buy and bill

## 2023-04-28 ENCOUNTER — TELEPHONE (OUTPATIENT)
Dept: INFUSION THERAPY | Facility: HOSPITAL | Age: 43
End: 2023-04-28
Payer: COMMERCIAL

## 2023-04-28 NOTE — TELEPHONE ENCOUNTER
"----- Message from Christiana Delgadillois sent at 4/28/2023  7:42 AM CDT -----  Regarding: RE: Venofer- buy and bill  Good Morning,    Based on a financial screening the patient does not qualify for financial assistance.  However, I mailed her an application with hope that she return it with the supporting documents.    Regards   ----- Message -----  From: Sera Mensah LPN  Sent: 4/28/2023   7:28 AM CDT  To: Christiana Carter, Palma Mathew NP, #  Subject: RE: Venofer- buy and bill                        Per Pre-Service, she does not have the benefit to obtain medication via Buy and Bill. [facility] would have to go through [facility] so see if she qualified for Pharmacy benefits for this particular medication which we just found out that she does not. They were unable to process it.     If she qualifies for Patient assistance through [employer], it seems like this maybe the only way she can get it.       Christiana, Would you be able to help? Would Ms. Hazel qualify for pt assistance to be able to pay for her Venofer treatments? Currently, she does not quality for Buy and Bill nor Pharmacy benefits to receive her Venofer. Below is the original message from Pre-Service regarding her denial for Medical Buy and Bill.         JONI Espino Staff; Sera Mensah LPN  Hello and Good afternoon:     I called  [facility] [employer]UR[employer]- spoke with (REP) Tucker MAYFIELD  According to (REP) they do not cover "Medical Buy & Bill" which is how we bill for infusions. Patient did have medical benefits coverage and we are in -network.     Sera  ----- Message -----  From: Zehra Suero, PharmD  Sent: 4/27/2023   4:22 PM CDT  To: Sera Mensah LPN, #  Subject: RE: Venofer- buy and bill                        I am not sure how medications are processed under buy and bill. I know it means it has to be processed through medical benefits. Sorry I can not be of better assistance. "     Thank You,     Zehra Suero PharmD     ----- Message -----  From: Palma Mathew NP  Sent: 4/27/2023   4:06 PM CDT  To: Sera Mensah LPN, #  Subject: RE: Venofer- buy and bill                        Do I need to send the prescription to [facility]? Or should this be ordered as supportive treatment plan  ----- Message -----  From: Zehra Suero PharmD  Sent: 4/27/2023   3:59 PM CDT  To: Sera Mensah LPN, #  Subject: Venofer- buy and bill                            Hello Palma Mathew NP and Staff,    [facility] is unable to fill Venofer under this patient's pharmacy benefit.  It is now an [facility] requirement that all provider administered medications follow the Buy and Bill procedure for billing of the drug and administration through the patient's medical benefits.  Please re-enter the order in [facility] as a medication order. For any further questions, please contact [facility] at [phone].      Thank You,     Zehra Suero PharmD

## 2023-04-28 NOTE — TELEPHONE ENCOUNTER
Below is a correspondence between Pre-Service Rep Gayatri TERRAZAS LPN, Zehra TERRAZAS, [facility], Provider Palma MENDOSA NP and myself regarding Ms. Hazel's Venofer Referral. Christiana Carter also included for Financial purposes.     Left a voicemail  and MyChart message for Ms. Hazel informing her that [facility] stated that she does not qualify for pharmacy benefits and nor does she qualify for patient assistance.    Infusion notified due to Ms. Hazel having an appt for 5.1.23.     Call back requested for any questions she may have.

## 2024-01-19 ENCOUNTER — PATIENT MESSAGE (OUTPATIENT)
Dept: INFUSION THERAPY | Facility: HOSPITAL | Age: 44
End: 2024-01-19
Payer: COMMERCIAL

## 2024-01-26 ENCOUNTER — LAB VISIT (OUTPATIENT)
Dept: LAB | Facility: HOSPITAL | Age: 44
End: 2024-01-26
Attending: INTERNAL MEDICINE
Payer: COMMERCIAL

## 2024-01-26 DIAGNOSIS — D50.8 OTHER IRON DEFICIENCY ANEMIA: ICD-10-CM

## 2024-01-26 LAB
BASOPHILS # BLD AUTO: 0.05 K/UL (ref 0–0.2)
BASOPHILS NFR BLD: 1.2 % (ref 0–1.9)
DIFFERENTIAL METHOD BLD: ABNORMAL
EOSINOPHIL # BLD AUTO: 0.1 K/UL (ref 0–0.5)
EOSINOPHIL NFR BLD: 1.4 % (ref 0–8)
ERYTHROCYTE [DISTWIDTH] IN BLOOD BY AUTOMATED COUNT: 15.5 % (ref 11.5–14.5)
FERRITIN SERPL-MCNC: 12 NG/ML (ref 20–300)
HCT VFR BLD AUTO: 34.2 % (ref 37–48.5)
HGB BLD-MCNC: 10.9 G/DL (ref 12–16)
IMM GRANULOCYTES # BLD AUTO: 0.01 K/UL (ref 0–0.04)
IMM GRANULOCYTES NFR BLD AUTO: 0.2 % (ref 0–0.5)
IRON SERPL-MCNC: 58 UG/DL (ref 30–160)
LYMPHOCYTES # BLD AUTO: 2.3 K/UL (ref 1–4.8)
LYMPHOCYTES NFR BLD: 52.1 % (ref 18–48)
MCH RBC QN AUTO: 25.2 PG (ref 27–31)
MCHC RBC AUTO-ENTMCNC: 31.9 G/DL (ref 32–36)
MCV RBC AUTO: 79 FL (ref 82–98)
MONOCYTES # BLD AUTO: 0.4 K/UL (ref 0.3–1)
MONOCYTES NFR BLD: 9.3 % (ref 4–15)
NEUTROPHILS # BLD AUTO: 1.6 K/UL (ref 1.8–7.7)
NEUTROPHILS NFR BLD: 35.8 % (ref 38–73)
NRBC BLD-RTO: 0 /100 WBC
PLATELET # BLD AUTO: 333 K/UL (ref 150–450)
PMV BLD AUTO: 8.6 FL (ref 9.2–12.9)
RBC # BLD AUTO: 4.32 M/UL (ref 4–5.4)
SATURATED IRON: 9 % (ref 20–50)
TOTAL IRON BINDING CAPACITY: 633 UG/DL (ref 250–450)
TRANSFERRIN SERPL-MCNC: 428 MG/DL (ref 200–375)
WBC # BLD AUTO: 4.32 K/UL (ref 3.9–12.7)

## 2024-01-26 PROCEDURE — 82728 ASSAY OF FERRITIN: CPT | Performed by: NURSE PRACTITIONER

## 2024-01-26 PROCEDURE — 83540 ASSAY OF IRON: CPT | Performed by: NURSE PRACTITIONER

## 2024-01-26 PROCEDURE — 85025 COMPLETE CBC W/AUTO DIFF WBC: CPT | Performed by: NURSE PRACTITIONER

## 2024-01-26 PROCEDURE — 36415 COLL VENOUS BLD VENIPUNCTURE: CPT | Performed by: NURSE PRACTITIONER

## 2024-01-30 ENCOUNTER — OFFICE VISIT (OUTPATIENT)
Dept: HEMATOLOGY/ONCOLOGY | Facility: CLINIC | Age: 44
End: 2024-01-30
Payer: COMMERCIAL

## 2024-01-30 DIAGNOSIS — D50.8 OTHER IRON DEFICIENCY ANEMIA: Primary | ICD-10-CM

## 2024-01-30 PROCEDURE — 1159F MED LIST DOCD IN RCRD: CPT | Mod: CPTII,95,, | Performed by: NURSE PRACTITIONER

## 2024-01-30 PROCEDURE — 99214 OFFICE O/P EST MOD 30 MIN: CPT | Mod: 95,,, | Performed by: NURSE PRACTITIONER

## 2024-01-30 PROCEDURE — 1160F RVW MEDS BY RX/DR IN RCRD: CPT | Mod: CPTII,95,, | Performed by: NURSE PRACTITIONER

## 2024-01-30 RX ORDER — SODIUM CHLORIDE 0.9 % (FLUSH) 0.9 %
10 SYRINGE (ML) INJECTION
Status: CANCELLED | OUTPATIENT
Start: 2024-02-13

## 2024-01-30 RX ORDER — HEPARIN 100 UNIT/ML
500 SYRINGE INTRAVENOUS
Status: CANCELLED | OUTPATIENT
Start: 2024-01-30

## 2024-01-30 RX ORDER — SODIUM CHLORIDE 0.9 % (FLUSH) 0.9 %
10 SYRINGE (ML) INJECTION
Status: CANCELLED | OUTPATIENT
Start: 2024-01-30

## 2024-01-30 RX ORDER — HEPARIN 100 UNIT/ML
500 SYRINGE INTRAVENOUS
Status: CANCELLED | OUTPATIENT
Start: 2024-02-13

## 2024-01-30 NOTE — ASSESSMENT & PLAN NOTE
Recurrent significant iron deficiency. Now with anemia.  She reports ongoing menorrhagia with GYN follow up planned    Arrange Feraheme weekly x 2. Urine pregnancy test prior. F/u 6 weeks after final Feraheme with cbc, iron, ferritin

## 2024-01-30 NOTE — PROGRESS NOTES
Subjective:       Patient ID: Marcia Hazel is a 43 y.o. female.    Chief Complaint: f/u h/o JANET    HPI: 43-year-old woman with history of obesity status post gastric sleeve 2016 with hiatal hernia and GERD.  She also has history of menorrhagia and has been following with Gynecology.  She notes prior use of estrogen containing oral contraceptive and has been off since May. She does note history of right lower extremity DVT in calf in the setting of immobility after a surgery and completed about 6 months of Eliquis.     She had been followed by Dr. Beckford, outside hematologist/oncologist for history of iron deficiency anemia.  She notes in the past receiving 8 doses of IV iron and more recently in August receive 4 more doses of IV iron therapy with subsequent lab work showing mild anemia hemoglobin 11, normal saturation, TIBC, transferrin.  No ferritin checked.  Patient also notes her outside oncologist had concerns about white blood cell count however per these labs within normal limits and normal differential.     She has mammogram for breast cancer screening which she reports is benign.  She has never had colonoscopy.  No family history of malignancy.    Today's visit:  Patient presenting today for follow up of her JANET. Notes increasing fatigue.     The patient location is: car  The chief complaint leading to consultation is: f/u JANET    Visit type: audiovisual    Face to Face time with patient: 10 minutes  30 minutes of total time spent on the encounter, which includes face to face time and non-face to face time preparing to see the patient (eg, review of tests), Obtaining and/or reviewing separately obtained history, Documenting clinical information in the electronic or other health record, Independently interpreting results (not separately reported) and communicating results to the patient/family/caregiver, or Care coordination (not separately reported).         Each patient to whom he or she provides  medical services by telemedicine is:  (1) informed of the relationship between the physician and patient and the respective role of any other health care provider with respect to management of the patient; and (2) notified that he or she may decline to receive medical services by telemedicine and may withdraw from such care at any time.    Notes:        Social History     Socioeconomic History    Marital status: Single   Tobacco Use    Smoking status: Never    Smokeless tobacco: Never   Substance and Sexual Activity    Alcohol use: Yes     Comment: social    Drug use: No    Sexual activity: Yes     Social Determinants of Health     Financial Resource Strain: Low Risk  (1/28/2024)    Overall Financial Resource Strain (CARDIA)     Difficulty of Paying Living Expenses: Not very hard   Food Insecurity: No Food Insecurity (1/28/2024)    Hunger Vital Sign     Worried About Running Out of Food in the Last Year: Never true     Ran Out of Food in the Last Year: Never true   Transportation Needs: No Transportation Needs (1/28/2024)    PRAPARE - Transportation     Lack of Transportation (Medical): No     Lack of Transportation (Non-Medical): No   Physical Activity: Inactive (1/28/2024)    Exercise Vital Sign     Days of Exercise per Week: 0 days     Minutes of Exercise per Session: 0 min   Stress: No Stress Concern Present (1/28/2024)    [employer] - Occupational Stress Questionnaire     Feeling of Stress : Not at all   Social Connections: Unknown (1/28/2024)    Social Connection and Isolation Panel [NHANES]     Frequency of Communication with Friends and Family: More than three times a week     Frequency of Social Gatherings with Friends and Family: Once a week     Active Member of Clubs or Organizations: No     Attends Club or Organization Meetings: Never     Marital Status: Never    Housing Stability: Low Risk  (1/28/2024)    Housing Stability Vital Sign     Unable to Pay for Housing in  the Last Year: No     Number of Places Lived in the Last Year: 1     Unstable Housing in the Last Year: No       Past Medical History:   Diagnosis Date    Acquired hypothyroidism     Benign hypertension     Deep vein thrombosis 08/2019    right leg    Dysmetabolic syndrome X 11/28/2016 3:39:32 PM    [facility] Historical - DO NOT USE: Insulin resistance-No Additional Notes    Encounter for general adult medical examination without abnormal findings 04/10/2017    Essential hypertension 11/28/2016 3:39:24 PM    [facility] Historical - Cardiovascular: Hypertension-No Additional Notes    GERD (gastroesophageal reflux disease)     Hyperlipidemia     Hyperlipidemia     Hypothyroidism 11/28/2016 3:39:29 PM    [facility] Historical - DO NOT USE: Hypothyroidism-No Additional Notes    Iron deficiency anemia     Iron deficiency anemia 1/14/2021 9:03:48 AM    [facility] Historical - Unknown: Iron deficiency-No Additional Notes    Metabolic syndrome X     ANTONIA on CPAP     Postsurgical malabsorption        Family History   Problem Relation Age of Onset    Diabetes Mother     Hypertension Mother        Past Surgical History:   Procedure Laterality Date    BELT ABDOMINOPLASTY      BUNIONECTOMY Right     GASTROSTOMY      partial    HERNIA REPAIR      MYOMECTOMY         Review of Systems   Constitutional:  Positive for fatigue. Negative for activity change, appetite change, chills, fever and unexpected weight change.   HENT:  Negative for congestion, mouth sores, nosebleeds, sore throat, trouble swallowing and voice change.    Eyes:  Negative for photophobia.   Respiratory:  Negative for cough, chest tightness, shortness of breath and wheezing.    Cardiovascular:  Negative for chest pain and leg swelling.   Gastrointestinal:  Negative for abdominal distention, abdominal pain, blood in stool, constipation, diarrhea, nausea and vomiting.   Genitourinary:  Positive for menstrual problem. Negative for difficulty urinating, dysuria  and hematuria.   Musculoskeletal:  Negative for arthralgias, back pain and myalgias.   Skin:  Negative for pallor, rash and wound.   Neurological:  Negative for dizziness, syncope, weakness and headaches.   Hematological:  Negative for adenopathy. Does not bruise/bleed easily.   Psychiatric/Behavioral:  The patient is not nervous/anxious.        Medication List with Changes/Refills   Current Medications    ASCORBIC ACID, VITAMIN C, (VITAMIN C) 100 MG TABLET    Take 100 mg by mouth once daily.    CARVEDILOL (COREG) 12.5 MG TABLET    TAKE 1 TABLET BY MOUTH TWICE A DAY    CHLORHEXIDINE (PERIDEX) 0.12 % SOLUTION    BRUSH AND FLOSS TEETH. SWISH 15ML FOR 30 SECONDS THEN EXPECTORATE USE TWICE DAILY    CYCLOBENZAPRINE (FLEXERIL) 10 MG TABLET    Take 1 tablet (10 mg total) by mouth 3 (three) times daily as needed for Muscle spasms.    DOCUSATE SODIUM (COLACE) 100 MG CAPSULE    Take 100 mg by mouth 2 (two) times daily.    ERGOCALCIFEROL (ERGOCALCIFEROL) 50,000 UNIT CAP    Take 1 capsule (50,000 Units total) by mouth every 7 days.    FERROUS SULFATE (FEOSOL) 325 MG (65 MG IRON) TAB TABLET    Take 1 tablet (325 mg total) by mouth daily with breakfast.    FUROSEMIDE (LASIX) 20 MG TABLET    Take 20 mg by mouth daily as needed.    HYDROCODONE-ACETAMINOPHEN (HYCET) SOLUTION 7.5-325 MG/15ML    TK 15 ML PO Q 6 H PRN P    ITRACONAZOLE (SPORANOX) 100 MG CAP        KETOCONAZOLE (NIZORAL) 2 % CREAM        KETOCONAZOLE (NIZORAL) 2 % SHAMPOO        LEVOTHYROXINE (SYNTHROID) 137 MCG TAB TABLET    Take 1 tablet (137 mcg total) by mouth once daily at 6am.    METFORMIN (GLUCOPHAGE-XR) 500 MG 24 HR TABLET    2 tablets twice daily    METFORMIN (GLUCOPHAGE-XR) 500 MG ER 24HR TABLET    Take 4 tablets by mouth once daily.    MULTIVITAMIN CAPSULE    Take 1 capsule by mouth once daily.    ONDANSETRON (ZOFRAN-ODT) 8 MG TBDL    TK 1 T PO Q 8 H PRN  FOR  NAUSEA    OXYCODONE-ACETAMINOPHEN (PERCOCET) 5-325 MG PER TABLET    TAKE 1 TO 2 TABLETS BY MOUTH  EVERY 4 TO 6 HOURS PRN P    PANTOPRAZOLE (PROTONIX) 40 MG TABLET    TAKE 1 TABLET BY MOUTH TWICE A DAY    POTASSIUM CHLORIDE (KLOR-CON) 10 MEQ TBSR        TIROSINT 150 MCG CAP    Take 1 capsule by mouth every morning.    TIRZEPATIDE (MOUNJARO) 2.5 MG/0.5 ML PNIJ    Inject 2.5 mg into the skin every 7 days.    TRIAMTERENE-HYDROCHLOROTHIAZIDE 37.5-25 MG (MAXZIDE-25) 37.5-25 MG PER TABLET    Take 1 tablet by mouth once daily.    VALACYCLOVIR (VALTREX) 1000 MG TABLET    TK 1 T PO ONCE D    ZINC SULFATE (ZINC-15 ORAL)    Take by mouth.     Objective:   There were no vitals filed for this visit.  Lab Results   Component Value Date    WBC 4.32 01/26/2024    HGB 10.9 (L) 01/26/2024    HCT 34.2 (L) 01/26/2024    MCV 79 (L) 01/26/2024     01/26/2024       BMP  Lab Results   Component Value Date     10/06/2023    K 3.3 (L) 10/06/2023    CL 98 10/06/2023    CO2 33 (H) 10/06/2023    BUN 7 10/06/2023    CREATININE 0.64 10/06/2023    CALCIUM 8.8 10/06/2023    ANIONGAP 10 08/20/2021    ESTGFRAFRICA >60 08/20/2021    EGFRNONAA 87 01/31/2022     Lab Results   Component Value Date    ALT 9 10/06/2023    AST 16 10/06/2023    GGT 46 11/15/2018    ALKPHOS 52 07/17/2020    BILITOT 0.3 10/06/2023       Physical Exam  Pulmonary:      Effort: Pulmonary effort is normal. No respiratory distress.   Neurological:      Mental Status: She is alert and oriented to person, place, and time.        Assessment:     Problem List Items Addressed This Visit          Oncology    Iron deficiency anemia - Primary     Recurrent significant iron deficiency. Now with anemia.  She reports ongoing menorrhagia with GYN follow up planned    Arrange Feraheme weekly x 2. Urine pregnancy test prior. F/u 6 weeks after final Feraheme with cbc, iron, ferritin         Relevant Orders    CBC Auto Differential    Iron and TIBC    Ferritin    Pregnancy, urine rapid         Plan:     Other iron deficiency anemia  -     CBC Auto Differential; Future; Expected  date: 01/30/2024  -     Iron and TIBC; Future; Expected date: 01/30/2024  -     Ferritin; Future; Expected date: 01/30/2024  -     Pregnancy, urine rapid; Future; Expected date: 01/30/2024    Other orders  -     ferumoxytoL (FERAHEME) 510 mg in dextrose 5 % (D5W) 117 mL IVPB  -     sodium chloride 0.9% 250 mL flush bag  -     sodium chloride 0.9% flush 10 mL  -     heparin, porcine (PF) 100 unit/mL injection flush 500 Units  -     alteplase injection 2 mg  -     ferumoxytoL (FERAHEME) 510 mg in dextrose 5 % (D5W) 117 mL IVPB  -     sodium chloride 0.9% 250 mL flush bag  -     sodium chloride 0.9% flush 10 mL  -     heparin, porcine (PF) 100 unit/mL injection flush 500 Units  -     alteplase injection 2 mg        Med Onc Chart Routing      Follow up with physician    Follow up with [facility] Other. 6 weeks after final dose of  Feraheme   Infusion scheduling note   weekly feraheme x 2 (urine preg test prior)   Injection scheduling note    Labs CBC, ferritin and iron and TIBC   Scheduling:  Preferred lab:  Lab interval:     Imaging None      Pharmacy appointment No pharmacy appointment needed      Other referrals       No additional referrals needed               SAMMI Leavitt

## 2024-01-31 ENCOUNTER — PATIENT MESSAGE (OUTPATIENT)
Dept: INFUSION THERAPY | Facility: HOSPITAL | Age: 44
End: 2024-01-31
Payer: COMMERCIAL

## 2024-02-12 ENCOUNTER — LAB VISIT (OUTPATIENT)
Dept: LAB | Facility: HOSPITAL | Age: 44
End: 2024-02-12
Payer: COMMERCIAL

## 2024-02-12 ENCOUNTER — INFUSION (OUTPATIENT)
Dept: INFUSION THERAPY | Facility: HOSPITAL | Age: 44
End: 2024-02-12
Attending: NURSE PRACTITIONER
Payer: COMMERCIAL

## 2024-02-12 VITALS
TEMPERATURE: 98 F | HEART RATE: 79 BPM | RESPIRATION RATE: 16 BRPM | OXYGEN SATURATION: 97 % | DIASTOLIC BLOOD PRESSURE: 88 MMHG | SYSTOLIC BLOOD PRESSURE: 143 MMHG

## 2024-02-12 DIAGNOSIS — D50.8 OTHER IRON DEFICIENCY ANEMIA: Primary | ICD-10-CM

## 2024-02-12 DIAGNOSIS — D50.8 OTHER IRON DEFICIENCY ANEMIA: ICD-10-CM

## 2024-02-12 LAB — B-HCG UR QL: NEGATIVE

## 2024-02-12 PROCEDURE — 63600175 PHARM REV CODE 636 W HCPCS: Mod: JZ,JG | Performed by: NURSE PRACTITIONER

## 2024-02-12 PROCEDURE — 96374 THER/PROPH/DIAG INJ IV PUSH: CPT

## 2024-02-12 PROCEDURE — 25000003 PHARM REV CODE 250: Performed by: NURSE PRACTITIONER

## 2024-02-12 PROCEDURE — 81025 URINE PREGNANCY TEST: CPT | Performed by: NURSE PRACTITIONER

## 2024-02-12 RX ORDER — HEPARIN 100 UNIT/ML
500 SYRINGE INTRAVENOUS
Status: DISCONTINUED | OUTPATIENT
Start: 2024-02-12 | End: 2024-02-12 | Stop reason: HOSPADM

## 2024-02-12 RX ORDER — SODIUM CHLORIDE 0.9 % (FLUSH) 0.9 %
10 SYRINGE (ML) INJECTION
Status: DISCONTINUED | OUTPATIENT
Start: 2024-02-12 | End: 2024-02-12 | Stop reason: HOSPADM

## 2024-02-12 RX ADMIN — FERUMOXYTOL 510 MG: 510 INJECTION INTRAVENOUS at 07:02

## 2024-02-12 NOTE — PLAN OF CARE
Patient tolerated first feraheme infusion well; discharged with work excuse and printout of next appt.       Problem: Adult Inpatient Plan of Care  Goal: Plan of Care Review  Outcome: Ongoing, Progressing  Flowsheets (Taken 2/12/2024 0750)  Plan of Care Reviewed With: patient  Goal: Optimal Comfort and Wellbeing  Outcome: Ongoing, Progressing  Intervention: Provide Person-Centered Care  Flowsheets (Taken 2/12/2024 0750)  Trust Relationship/Rapport:   care explained   reassurance provided   choices provided   thoughts/feelings acknowledged   emotional support provided   empathic listening provided   questions answered   questions encouraged

## 2024-02-19 ENCOUNTER — INFUSION (OUTPATIENT)
Dept: INFUSION THERAPY | Facility: HOSPITAL | Age: 44
End: 2024-02-19
Attending: NURSE PRACTITIONER
Payer: COMMERCIAL

## 2024-02-19 VITALS
OXYGEN SATURATION: 98 % | HEART RATE: 94 BPM | DIASTOLIC BLOOD PRESSURE: 81 MMHG | SYSTOLIC BLOOD PRESSURE: 144 MMHG | RESPIRATION RATE: 16 BRPM | TEMPERATURE: 98 F

## 2024-02-19 DIAGNOSIS — D50.8 OTHER IRON DEFICIENCY ANEMIA: Primary | ICD-10-CM

## 2024-02-19 PROCEDURE — 25000003 PHARM REV CODE 250: Performed by: NURSE PRACTITIONER

## 2024-02-19 PROCEDURE — 96365 THER/PROPH/DIAG IV INF INIT: CPT

## 2024-02-19 PROCEDURE — 63600175 PHARM REV CODE 636 W HCPCS: Mod: JZ,JG | Performed by: NURSE PRACTITIONER

## 2024-02-19 RX ADMIN — FERUMOXYTOL 510 MG: 510 INJECTION INTRAVENOUS at 07:02

## 2024-02-19 NOTE — DISCHARGE INSTRUCTIONS
Thank you for letting me take care of you today!  - Sana VILCHIS RN      [facility]  [address] [address]  [phone] phone     [phone] fax  Hours of Operation: Monday- Friday 8:00am- 5:00pm  After hours phone  [phone]  Hematology / Oncology Physicians on call    Dr. Ceferino Be, LOU Shankar, TONY Peoples, TONY Berkowitz, NP      Please call with any concerns regarding your appointment today.     Feraheme    Diagnostic ability of MRI may be affected for up to 3 months after surgery.    Common side effects include:  Headache.  Upset stomach.  Diarrhea or constipation  Low blood pressure    What are some adverse effects that I need to call my doctor about right away?   Signs of an allergic reaction, such as:   Rash/hives;   itching; red, swollen, blistered, or peeling skin with or without fever;   wheezing;   tightness in the chest or throat;   trouble breathing, swallowing, or talking;   unusual hoarseness;  swelling of the mouth, face, lips, tongue, or throat.  Swelling in the arms or legs.

## 2024-02-19 NOTE — LETTER
February 19, 2024      [facility] [place] [facility] [address]  [address] [facility]  [place] [address]  Phone: [phone]  Fax: [phone]       Patient: Marcia Hazel   YOB: 1980  Date of Visit: 02/19/2024    To Whom It May Concern:    Marcelo Hazel  was at [facility] on 02/19/2024. The patient may return to work/school on 2/19/24 with no restrictions. If you have any questions or concerns, or if I can be of further assistance, please do not hesitate to contact me.    Sincerely,    Kasey Giron RN

## 2024-02-19 NOTE — PLAN OF CARE
Patient tolerated Feraheme well today; no adverse reaction noted.  POC reviewed with pt.  No questions or concerns voiced.  NAD noted upon discharge.  No significant new complaints voiced.   Has f/u appt(s) scheduled per MD request.      Problem: Adult Inpatient Plan of Care  Goal: Plan of Care Review  Outcome: Met  Flowsheets (Taken 2/19/2024 0808)  Plan of Care Reviewed With: patient  Goal: Patient-Specific Goal (Individualized)  Outcome: Met  Flowsheets (Taken 2/19/2024 0808)  Anxieties, Fears or Concerns: denies  Individualized Care Needs: blanket, reclined position, legs elevated, refreshements provided  Goal: Optimal Comfort and Wellbeing  Outcome: Met  Intervention: Provide Person-Centered Care  Flowsheets (Taken 2/19/2024 0808)  Trust Relationship/Rapport:   care explained   questions answered   thoughts/feelings acknowledged   choices provided   questions encouraged   emotional support provided   reassurance provided   empathic listening provided      Transfer remotes from Dr. Quijano to Dr. Reyes.  Thank you!

## 2024-04-01 ENCOUNTER — LAB VISIT (OUTPATIENT)
Dept: LAB | Facility: HOSPITAL | Age: 44
End: 2024-04-01
Attending: NURSE PRACTITIONER
Payer: COMMERCIAL

## 2024-04-01 DIAGNOSIS — D50.8 OTHER IRON DEFICIENCY ANEMIA: ICD-10-CM

## 2024-04-01 DIAGNOSIS — E88.819 INSULIN RESISTANCE: ICD-10-CM

## 2024-04-01 DIAGNOSIS — E87.6 HYPOPOTASSEMIA: ICD-10-CM

## 2024-04-01 LAB
ALBUMIN SERPL BCP-MCNC: 3.7 G/DL (ref 3.5–5.2)
ANION GAP SERPL CALC-SCNC: 11 MMOL/L (ref 8–16)
ANISOCYTOSIS BLD QL SMEAR: SLIGHT
BASOPHILS # BLD AUTO: 0.07 K/UL (ref 0–0.2)
BASOPHILS NFR BLD: 1.6 % (ref 0–1.9)
BUN SERPL-MCNC: 14 MG/DL (ref 6–20)
CALCIUM SERPL-MCNC: 9.9 MG/DL (ref 8.7–10.5)
CHLORIDE SERPL-SCNC: 97 MMOL/L (ref 95–110)
CO2 SERPL-SCNC: 30 MMOL/L (ref 23–29)
CREAT SERPL-MCNC: 1 MG/DL (ref 0.5–1.4)
DACRYOCYTES BLD QL SMEAR: ABNORMAL
DIFFERENTIAL METHOD BLD: ABNORMAL
EOSINOPHIL # BLD AUTO: 0.1 K/UL (ref 0–0.5)
EOSINOPHIL NFR BLD: 1.9 % (ref 0–8)
ERYTHROCYTE [DISTWIDTH] IN BLOOD BY AUTOMATED COUNT: 22.6 % (ref 11.5–14.5)
EST. GFR  (NO RACE VARIABLE): >60 ML/MIN/1.73 M^2
FERRITIN SERPL-MCNC: 72 NG/ML (ref 20–300)
GLUCOSE SERPL-MCNC: 87 MG/DL (ref 70–110)
HCT VFR BLD AUTO: 40.7 % (ref 37–48.5)
HGB BLD-MCNC: 13.6 G/DL (ref 12–16)
IMM GRANULOCYTES # BLD AUTO: 0 K/UL (ref 0–0.04)
IMM GRANULOCYTES NFR BLD AUTO: 0 % (ref 0–0.5)
IRON SERPL-MCNC: 116 UG/DL (ref 30–160)
LYMPHOCYTES # BLD AUTO: 2 K/UL (ref 1–4.8)
LYMPHOCYTES NFR BLD: 47.2 % (ref 18–48)
MCH RBC QN AUTO: 29.8 PG (ref 27–31)
MCHC RBC AUTO-ENTMCNC: 33.4 G/DL (ref 32–36)
MCV RBC AUTO: 89 FL (ref 82–98)
MONOCYTES # BLD AUTO: 0.5 K/UL (ref 0.3–1)
MONOCYTES NFR BLD: 11.4 % (ref 4–15)
NEUTROPHILS # BLD AUTO: 1.6 K/UL (ref 1.8–7.7)
NEUTROPHILS NFR BLD: 37.9 % (ref 38–73)
NRBC BLD-RTO: 0 /100 WBC
PHOSPHATE SERPL-MCNC: 3.5 MG/DL (ref 2.7–4.5)
PLATELET # BLD AUTO: 310 K/UL (ref 150–450)
PMV BLD AUTO: 8.8 FL (ref 9.2–12.9)
POIKILOCYTOSIS BLD QL SMEAR: SLIGHT
POTASSIUM SERPL-SCNC: 3.8 MMOL/L (ref 3.5–5.1)
RBC # BLD AUTO: 4.56 M/UL (ref 4–5.4)
SATURATED IRON: 24 % (ref 20–50)
SODIUM SERPL-SCNC: 138 MMOL/L (ref 136–145)
T4 FREE SERPL-MCNC: 1.1 NG/DL (ref 0.71–1.51)
TOTAL IRON BINDING CAPACITY: 477 UG/DL (ref 250–450)
TRANSFERRIN SERPL-MCNC: 322 MG/DL (ref 200–375)
TSH SERPL DL<=0.005 MIU/L-ACNC: 2.06 UIU/ML (ref 0.4–4)
WBC # BLD AUTO: 4.3 K/UL (ref 3.9–12.7)

## 2024-04-01 PROCEDURE — 80069 RENAL FUNCTION PANEL: CPT | Performed by: INTERNAL MEDICINE

## 2024-04-01 PROCEDURE — 83540 ASSAY OF IRON: CPT | Performed by: NURSE PRACTITIONER

## 2024-04-01 PROCEDURE — 85025 COMPLETE CBC W/AUTO DIFF WBC: CPT | Performed by: NURSE PRACTITIONER

## 2024-04-01 PROCEDURE — 82728 ASSAY OF FERRITIN: CPT | Performed by: NURSE PRACTITIONER

## 2024-04-01 PROCEDURE — 36415 COLL VENOUS BLD VENIPUNCTURE: CPT | Performed by: NURSE PRACTITIONER

## 2024-04-01 PROCEDURE — 84439 ASSAY OF FREE THYROXINE: CPT | Performed by: INTERNAL MEDICINE

## 2024-04-01 PROCEDURE — 84443 ASSAY THYROID STIM HORMONE: CPT | Performed by: INTERNAL MEDICINE

## 2024-04-05 ENCOUNTER — OFFICE VISIT (OUTPATIENT)
Dept: HEMATOLOGY/ONCOLOGY | Facility: CLINIC | Age: 44
End: 2024-04-05
Payer: COMMERCIAL

## 2024-04-05 DIAGNOSIS — D50.8 OTHER IRON DEFICIENCY ANEMIA: Primary | ICD-10-CM

## 2024-04-05 PROCEDURE — 1160F RVW MEDS BY RX/DR IN RCRD: CPT | Mod: CPTII,95,, | Performed by: NURSE PRACTITIONER

## 2024-04-05 PROCEDURE — 1159F MED LIST DOCD IN RCRD: CPT | Mod: CPTII,95,, | Performed by: NURSE PRACTITIONER

## 2024-04-05 PROCEDURE — 99213 OFFICE O/P EST LOW 20 MIN: CPT | Mod: 95,,, | Performed by: NURSE PRACTITIONER

## 2024-04-05 NOTE — ASSESSMENT & PLAN NOTE
Resolution of anemia and iron deficiency s/p IV iron. TIBC remains elevated. She reports ongoing menorrhagia with GYN follow up planned. Recently started progesterone     F/u 3 months with cbc, iron, ferritin

## 2024-04-05 NOTE — PROGRESS NOTES
Subjective:       Patient ID: Marcia Hazel is a 43 y.o. female.    Chief Complaint: f/u h/o JANET    HPI: 43-year-old woman with history of obesity status post gastric sleeve 2016 with hiatal hernia and GERD.  She also has history of menorrhagia and has been following with Gynecology.  She notes prior use of estrogen containing oral contraceptive and has been off since May. She does note history of right lower extremity DVT in calf in the setting of immobility after a surgery and completed about 6 months of Eliquis.     She had been followed by Dr. Beckford, outside hematologist/oncologist for history of iron deficiency anemia.  She notes in the past receiving 8 doses of IV iron and more recently in August receive 4 more doses of IV iron therapy with subsequent lab work showing mild anemia hemoglobin 11, normal saturation, TIBC, transferrin.  No ferritin checked.  Patient also notes her outside oncologist had concerns about white blood cell count however per these labs within normal limits and normal differential.     She has mammogram for breast cancer screening which she reports is benign.  She has never had colonoscopy.  No family history of malignancy.    Today's visit:  Patient presenting today for follow up of her JANET. Notes increasing fatigue.     The patient location is: home  The chief complaint leading to consultation is: f/u JANET    Visit type: audiovisual    Face to Face time with patient: 10 minutes  25 minutes of total time spent on the encounter, which includes face to face time and non-face to face time preparing to see the patient (eg, review of tests), Obtaining and/or reviewing separately obtained history, Documenting clinical information in the electronic or other health record, Independently interpreting results (not separately reported) and communicating results to the patient/family/caregiver, or Care coordination (not separately reported).         Each patient to whom he or she provides  medical services by telemedicine is:  (1) informed of the relationship between the physician and patient and the respective role of any other health care provider with respect to management of the patient; and (2) notified that he or she may decline to receive medical services by telemedicine and may withdraw from such care at any time.    Notes:        Social History     Socioeconomic History    Marital status: Single   Tobacco Use    Smoking status: Never    Smokeless tobacco: Never   Substance and Sexual Activity    Alcohol use: Yes     Comment: social    Drug use: No    Sexual activity: Yes     Social Determinants of Health     Financial Resource Strain: Low Risk  (1/28/2024)    Overall Financial Resource Strain (CARDIA)     Difficulty of Paying Living Expenses: Not very hard   Food Insecurity: No Food Insecurity (1/28/2024)    Hunger Vital Sign     Worried About Running Out of Food in the Last Year: Never true     Ran Out of Food in the Last Year: Never true   Transportation Needs: No Transportation Needs (1/28/2024)    PRAPARE - Transportation     Lack of Transportation (Medical): No     Lack of Transportation (Non-Medical): No   Physical Activity: Inactive (1/28/2024)    Exercise Vital Sign     Days of Exercise per Week: 0 days     Minutes of Exercise per Session: 0 min   Stress: No Stress Concern Present (1/28/2024)    [employer] [place] [employer] - Occupational Stress Questionnaire     Feeling of Stress : Not at all   Social Connections: Unknown (1/28/2024)    Social Connection and Isolation Panel [NHANES]     Frequency of Communication with Friends and Family: More than three times a week     Frequency of Social Gatherings with Friends and Family: Once a week     Active Member of Clubs or Organizations: No     Attends Club or Organization Meetings: Never     Marital Status: Never    Housing Stability: Low Risk  (1/28/2024)    Housing Stability Vital Sign     Unable to Pay for Housing in  the Last Year: No     Number of Places Lived in the Last Year: 1     Unstable Housing in the Last Year: No       Past Medical History:   Diagnosis Date    Acquired hypothyroidism     Benign hypertension     Deep vein thrombosis 08/2019    right leg    Dysmetabolic syndrome X 11/28/2016 3:39:32 PM    [facility] Historical - DO NOT USE: Insulin resistance-No Additional Notes    Encounter for general adult medical examination without abnormal findings 04/10/2017    Essential hypertension 11/28/2016 3:39:24 PM    [facility] Historical - Cardiovascular: Hypertension-No Additional Notes    GERD (gastroesophageal reflux disease)     Hyperlipidemia     Hyperlipidemia     Hypothyroidism 11/28/2016 3:39:29 PM    [facility] Historical - DO NOT USE: Hypothyroidism-No Additional Notes    Iron deficiency anemia     Iron deficiency anemia 1/14/2021 9:03:48 AM    [facility] Historical - Unknown: Iron deficiency-No Additional Notes    Metabolic syndrome X     ANTONIA on CPAP     Postsurgical malabsorption        Family History   Problem Relation Age of Onset    Diabetes Mother     Hypertension Mother        Past Surgical History:   Procedure Laterality Date    BELT ABDOMINOPLASTY      BUNIONECTOMY Right     GASTROSTOMY      partial    HERNIA REPAIR      MYOMECTOMY         Review of Systems   Constitutional:  Positive for fatigue. Negative for activity change, appetite change, chills, fever and unexpected weight change.   HENT:  Negative for congestion, mouth sores, nosebleeds, sore throat, trouble swallowing and voice change.    Eyes:  Negative for photophobia.   Respiratory:  Negative for cough, chest tightness, shortness of breath and wheezing.    Cardiovascular:  Negative for chest pain and leg swelling.   Gastrointestinal:  Negative for abdominal distention, abdominal pain, blood in stool, constipation, diarrhea, nausea and vomiting.   Genitourinary:  Positive for menstrual problem. Negative for difficulty urinating, dysuria  and hematuria.   Musculoskeletal:  Negative for arthralgias, back pain and myalgias.   Skin:  Negative for pallor, rash and wound.   Neurological:  Negative for dizziness, syncope, weakness and headaches.   Hematological:  Negative for adenopathy. Does not bruise/bleed easily.   Psychiatric/Behavioral:  The patient is not nervous/anxious.        Medication List with Changes/Refills   Current Medications    ASCORBIC ACID, VITAMIN C, (VITAMIN C) 100 MG TABLET    Take 100 mg by mouth once daily.    CARVEDILOL (COREG) 12.5 MG TABLET    TAKE 1 TABLET BY MOUTH TWICE A DAY    CHLORHEXIDINE (PERIDEX) 0.12 % SOLUTION    BRUSH AND FLOSS TEETH. SWISH 15ML FOR 30 SECONDS THEN EXPECTORATE USE TWICE DAILY    CYCLOBENZAPRINE (FLEXERIL) 10 MG TABLET    TAKE 1 TABLET BY MOUTH THREE TIMES A DAY AS NEEDED FOR MUSCLE SPASMS    DOCUSATE SODIUM (COLACE) 100 MG CAPSULE    Take 100 mg by mouth 2 (two) times daily.    ERGOCALCIFEROL (ERGOCALCIFEROL) 50,000 UNIT CAP    Take 1 capsule (50,000 Units total) by mouth every 7 days.    FERROUS SULFATE (FEOSOL) 325 MG (65 MG IRON) TAB TABLET    Take 1 tablet (325 mg total) by mouth daily with breakfast.    FUROSEMIDE (LASIX) 20 MG TABLET    Take 20 mg by mouth daily as needed.    HYDROCODONE-ACETAMINOPHEN (HYCET) SOLUTION 7.5-325 MG/15ML    TK 15 ML PO Q 6 H PRN P    ITRACONAZOLE (SPORANOX) 100 MG CAP        KETOCONAZOLE (NIZORAL) 2 % CREAM        KETOCONAZOLE (NIZORAL) 2 % SHAMPOO        LEVOTHYROXINE (SYNTHROID) 137 MCG TAB TABLET    Take 1 tablet (137 mcg total) by mouth once daily at 6am.    MEDROXYPROGESTERONE (PROVERA) 10 MG TABLET    Take 2 tablets (20 mg total) by mouth once daily.    METFORMIN (GLUCOPHAGE-XR) 500 MG 24 HR TABLET    2 tablets twice daily    METFORMIN (GLUCOPHAGE-XR) 500 MG ER 24HR TABLET    Take 4 tablets by mouth once daily.    MULTIVITAMIN CAPSULE    Take 1 capsule by mouth once daily.    ONDANSETRON (ZOFRAN-ODT) 8 MG TBDL    TK 1 T PO Q 8 H PRN  FOR  NAUSEA     OXYCODONE-ACETAMINOPHEN (PERCOCET) 5-325 MG PER TABLET    TAKE 1 TO 2 TABLETS BY MOUTH EVERY 4 TO 6 HOURS PRN P    PANTOPRAZOLE (PROTONIX) 40 MG TABLET    TAKE 1 TABLET BY MOUTH TWICE A DAY    POTASSIUM CHLORIDE (KLOR-CON) 10 MEQ TBSR        TIROSINT 150 MCG CAP    Take 1 capsule by mouth every morning.    TIRZEPATIDE (MOUNJARO) 2.5 MG/0.5 ML PNIJ    Inject 2.5 mg into the skin every 7 days.    TRIAMTERENE-HYDROCHLOROTHIAZIDE 37.5-25 MG (MAXZIDE-25) 37.5-25 MG PER TABLET    Take 1 tablet by mouth once daily.    VALACYCLOVIR (VALTREX) 1000 MG TABLET    TK 1 T PO ONCE D    ZINC SULFATE (ZINC-15 ORAL)    Take by mouth.     Objective:   There were no vitals filed for this visit.  Lab Results   Component Value Date    WBC 4.30 04/01/2024    HGB 13.6 04/01/2024    HCT 40.7 04/01/2024    MCV 89 04/01/2024     04/01/2024       BMP  Lab Results   Component Value Date     04/01/2024    K 3.8 04/01/2024    CL 97 04/01/2024    CO2 30 (H) 04/01/2024    BUN 14 04/01/2024    CREATININE 1.0 04/01/2024    CALCIUM 9.9 04/01/2024    ANIONGAP 11 04/01/2024    ESTGFRAFRICA >60 08/20/2021    EGFRNONAA 87 01/31/2022     Lab Results   Component Value Date    ALT 9 10/06/2023    AST 16 10/06/2023    GGT 46 11/15/2018    ALKPHOS 52 07/17/2020    BILITOT 0.3 10/06/2023       Physical Exam  Pulmonary:      Effort: Pulmonary effort is normal. No respiratory distress.   Neurological:      Mental Status: She is alert and oriented to person, place, and time.        Assessment:     Problem List Items Addressed This Visit          Oncology    Iron deficiency anemia - Primary     Resolution of anemia and iron deficiency s/p IV iron. TIBC remains elevated. She reports ongoing menorrhagia with GYN follow up planned. Recently started progesterone     F/u 3 months with cbc, iron, ferritin         Relevant Orders    CBC Auto Differential    Iron and TIBC    Ferritin         Plan:     Other iron deficiency anemia  -     CBC Auto Differential;  Future; Expected date: 04/05/2024  -     Iron and TIBC; Future; Expected date: 04/05/2024  -     Ferritin; Future; Expected date: 04/05/2024        Med Onc Chart Routing      Follow up with physician    Follow up with [facility] 3 months.   Infusion scheduling note    Injection scheduling note    Labs CBC, ferritin and iron and TIBC   Scheduling:  Preferred lab:  Lab interval:  1-2 days prior   Imaging None      Pharmacy appointment No pharmacy appointment needed      Other referrals       No additional referrals needed               DONALDO Leavitt-C

## 2024-05-28 ENCOUNTER — PATIENT MESSAGE (OUTPATIENT)
Dept: HEMATOLOGY/ONCOLOGY | Facility: CLINIC | Age: 44
End: 2024-05-28
Payer: COMMERCIAL

## 2024-07-05 ENCOUNTER — LAB VISIT (OUTPATIENT)
Dept: LAB | Facility: HOSPITAL | Age: 44
End: 2024-07-05
Attending: NURSE PRACTITIONER
Payer: COMMERCIAL

## 2024-07-05 DIAGNOSIS — D50.8 OTHER IRON DEFICIENCY ANEMIA: ICD-10-CM

## 2024-07-05 LAB
BASOPHILS # BLD AUTO: 0.05 K/UL (ref 0–0.2)
BASOPHILS NFR BLD: 1.3 % (ref 0–1.9)
DIFFERENTIAL METHOD BLD: ABNORMAL
EOSINOPHIL # BLD AUTO: 0.1 K/UL (ref 0–0.5)
EOSINOPHIL NFR BLD: 1.5 % (ref 0–8)
ERYTHROCYTE [DISTWIDTH] IN BLOOD BY AUTOMATED COUNT: 13.9 % (ref 11.5–14.5)
HCT VFR BLD AUTO: 41 % (ref 37–48.5)
HGB BLD-MCNC: 13.6 G/DL (ref 12–16)
IMM GRANULOCYTES # BLD AUTO: 0.01 K/UL (ref 0–0.04)
IMM GRANULOCYTES NFR BLD AUTO: 0.3 % (ref 0–0.5)
IRON SERPL-MCNC: 118 UG/DL (ref 30–160)
LYMPHOCYTES # BLD AUTO: 1.4 K/UL (ref 1–4.8)
LYMPHOCYTES NFR BLD: 35.6 % (ref 18–48)
MCH RBC QN AUTO: 31.3 PG (ref 27–31)
MCHC RBC AUTO-ENTMCNC: 33.2 G/DL (ref 32–36)
MCV RBC AUTO: 94 FL (ref 82–98)
MONOCYTES # BLD AUTO: 0.5 K/UL (ref 0.3–1)
MONOCYTES NFR BLD: 12.1 % (ref 4–15)
NEUTROPHILS # BLD AUTO: 1.9 K/UL (ref 1.8–7.7)
NEUTROPHILS NFR BLD: 49.2 % (ref 38–73)
NRBC BLD-RTO: 0 /100 WBC
PLATELET # BLD AUTO: 273 K/UL (ref 150–450)
PMV BLD AUTO: 8.6 FL (ref 9.2–12.9)
RBC # BLD AUTO: 4.35 M/UL (ref 4–5.4)
SATURATED IRON: 22 % (ref 20–50)
TOTAL IRON BINDING CAPACITY: 528 UG/DL (ref 250–450)
TRANSFERRIN SERPL-MCNC: 357 MG/DL (ref 200–375)
WBC # BLD AUTO: 3.9 K/UL (ref 3.9–12.7)

## 2024-07-05 PROCEDURE — 82728 ASSAY OF FERRITIN: CPT | Performed by: NURSE PRACTITIONER

## 2024-07-05 PROCEDURE — 36415 COLL VENOUS BLD VENIPUNCTURE: CPT | Performed by: NURSE PRACTITIONER

## 2024-07-05 PROCEDURE — 83540 ASSAY OF IRON: CPT | Performed by: NURSE PRACTITIONER

## 2024-07-05 PROCEDURE — 85025 COMPLETE CBC W/AUTO DIFF WBC: CPT | Performed by: NURSE PRACTITIONER

## 2024-07-06 LAB — FERRITIN SERPL-MCNC: 29 NG/ML (ref 20–300)

## 2024-07-11 ENCOUNTER — OFFICE VISIT (OUTPATIENT)
Dept: HEMATOLOGY/ONCOLOGY | Facility: CLINIC | Age: 44
End: 2024-07-11
Payer: COMMERCIAL

## 2024-07-11 DIAGNOSIS — D50.0 IRON DEFICIENCY ANEMIA DUE TO CHRONIC BLOOD LOSS: Primary | ICD-10-CM

## 2024-07-11 DIAGNOSIS — Z98.84 H/O BARIATRIC SURGERY: ICD-10-CM

## 2024-07-11 PROCEDURE — 3044F HG A1C LEVEL LT 7.0%: CPT | Mod: CPTII,95,, | Performed by: NURSE PRACTITIONER

## 2024-07-11 PROCEDURE — 3066F NEPHROPATHY DOC TX: CPT | Mod: CPTII,95,, | Performed by: NURSE PRACTITIONER

## 2024-07-11 PROCEDURE — 99214 OFFICE O/P EST MOD 30 MIN: CPT | Mod: 95,,, | Performed by: NURSE PRACTITIONER

## 2024-07-11 PROCEDURE — 1160F RVW MEDS BY RX/DR IN RCRD: CPT | Mod: CPTII,95,, | Performed by: NURSE PRACTITIONER

## 2024-07-11 PROCEDURE — 3061F NEG MICROALBUMINURIA REV: CPT | Mod: CPTII,95,, | Performed by: NURSE PRACTITIONER

## 2024-07-11 PROCEDURE — 1159F MED LIST DOCD IN RCRD: CPT | Mod: CPTII,95,, | Performed by: NURSE PRACTITIONER

## 2024-07-11 NOTE — ASSESSMENT & PLAN NOTE
No anemia. Saturated iron and ferritin normal. TIBC remains elevated. She reports ongoing irregular menstrual cycles with GYN follow up. Reports some improvement in menorrhagia. Upcoming interventional Radiology visit to discuss treatment of fibroids     F/u 3 months with cbc, iron, ferritin. Discussed S&S to report sooner

## 2024-07-11 NOTE — PROGRESS NOTES
Subjective:       Patient ID: Marcia Hazel is a 44 y.o. female.    Chief Complaint: f/u h/o JANET    HPI: 44-year-old woman with history of obesity status post gastric sleeve 2016 with hiatal hernia and GERD.  She also has history of menorrhagia and has been following with Gynecology.  She notes prior use of estrogen containing oral contraceptive and has been off since May. She does note history of right lower extremity DVT in calf in the setting of immobility after a surgery and completed about 6 months of Eliquis.     She had been followed by Dr. Beckford, outside hematologist/oncologist for history of iron deficiency anemia.  She notes in the past receiving 8 doses of IV iron and more recently in August receive 4 more doses of IV iron therapy with subsequent lab work showing mild anemia hemoglobin 11, normal saturation, TIBC, transferrin.  No ferritin checked.  Patient also notes her outside oncologist had concerns about white blood cell count however per these labs within normal limits and normal differential.     She has mammogram for breast cancer screening which she reports is benign.  She has never had colonoscopy.  No family history of malignancy.    Today's visit:  Patient presenting today for follow up of her JANET. She feels well but still with ongoing menstrual cycle issues     The patient location is: home  The chief complaint leading to consultation is: f/u JANET    Visit type: audiovisual    Face to Face time with patient: 10 minutes  25 minutes of total time spent on the encounter, which includes face to face time and non-face to face time preparing to see the patient (eg, review of tests), Obtaining and/or reviewing separately obtained history, Documenting clinical information in the electronic or other health record, Independently interpreting results (not separately reported) and communicating results to the patient/family/caregiver, or Care coordination (not separately reported).         Each  patient to whom he or she provides medical services by telemedicine is:  (1) informed of the relationship between the physician and patient and the respective role of any other health care provider with respect to management of the patient; and (2) notified that he or she may decline to receive medical services by telemedicine and may withdraw from such care at any time.    Notes:        Social History     Socioeconomic History    Marital status: Single   Tobacco Use    Smoking status: Never    Smokeless tobacco: Never   Substance and Sexual Activity    Alcohol use: Yes     Comment: social    Drug use: No    Sexual activity: Yes     Social Determinants of Health     Financial Resource Strain: Low Risk  (1/28/2024)    Overall Financial Resource Strain (CARDIA)     Difficulty of Paying Living Expenses: Not very hard   Food Insecurity: No Food Insecurity (1/28/2024)    Hunger Vital Sign     Worried About Running Out of Food in the Last Year: Never true     Ran Out of Food in the Last Year: Never true   Transportation Needs: No Transportation Needs (1/28/2024)    PRAPARE - Transportation     Lack of Transportation (Medical): No     Lack of Transportation (Non-Medical): No   Physical Activity: Inactive (1/28/2024)    Exercise Vital Sign     Days of Exercise per Week: 0 days     Minutes of Exercise per Session: 0 min   Stress: No Stress Concern Present (1/28/2024)    [employer] [place] [employer] - Occupational Stress Questionnaire     Feeling of Stress : Not at all   Housing Stability: Low Risk  (1/28/2024)    Housing Stability Vital Sign     Unable to Pay for Housing in the Last Year: No     Number of Places Lived in the Last Year: 1     Unstable Housing in the Last Year: No       Past Medical History:   Diagnosis Date    Acquired hypothyroidism     Benign hypertension     Deep vein thrombosis 08/2019    right leg    Dysmetabolic syndrome X 11/28/2016 3:39:32 PM    [facility] - DO NOT USE:  Insulin resistance-No Additional Notes    Encounter for general adult medical examination without abnormal findings 04/10/2017    Essential hypertension 11/28/2016 3:39:24 PM    [facility] Historical - Cardiovascular: Hypertension-No Additional Notes    GERD (gastroesophageal reflux disease)     Hyperlipidemia     Hyperlipidemia     Hypothyroidism 11/28/2016 3:39:29 PM    [facility] Historical - DO NOT USE: Hypothyroidism-No Additional Notes    Iron deficiency anemia     Iron deficiency anemia 1/14/2021 9:03:48 AM    [facility] Historical - Unknown: Iron deficiency-No Additional Notes    Metabolic syndrome X     ANTONIA on CPAP     Postsurgical malabsorption        Family History   Problem Relation Name Age of Onset    Diabetes Mother      Hypertension Mother         Past Surgical History:   Procedure Laterality Date    BELT ABDOMINOPLASTY      BUNIONECTOMY Right     GASTROSTOMY      partial    HERNIA REPAIR      MYOMECTOMY         Review of Systems   Constitutional:  Negative for activity change, appetite change, chills, fatigue, fever and unexpected weight change.   HENT:  Negative for congestion, mouth sores, nosebleeds, sore throat, trouble swallowing and voice change.    Eyes:  Negative for photophobia.   Respiratory:  Negative for cough, chest tightness, shortness of breath and wheezing.    Cardiovascular:  Negative for chest pain and leg swelling.   Gastrointestinal:  Negative for abdominal distention, abdominal pain, blood in stool, constipation, diarrhea, nausea and vomiting.   Genitourinary:  Positive for menstrual problem. Negative for difficulty urinating, dysuria and hematuria.   Musculoskeletal:  Negative for arthralgias, back pain and myalgias.   Skin:  Negative for pallor, rash and wound.   Neurological:  Negative for dizziness, syncope, weakness and headaches.   Hematological:  Negative for adenopathy. Does not bruise/bleed easily.   Psychiatric/Behavioral:  The patient is not nervous/anxious.         Medication List with Changes/Refills   Current Medications    AMLODIPINE (NORVASC) 5 MG TABLET    Take 1 tablet (5 mg total) by mouth once daily.    ASCORBIC ACID, VITAMIN C, (VITAMIN C) 100 MG TABLET    Take 100 mg by mouth once daily.    CARVEDILOL (COREG) 25 MG TABLET    Take 1 tablet (25 mg total) by mouth 2 (two) times daily with meals.    CHLORHEXIDINE (PERIDEX) 0.12 % SOLUTION    BRUSH AND FLOSS TEETH. SWISH 15ML FOR 30 SECONDS THEN EXPECTORATE USE TWICE DAILY    CYCLOBENZAPRINE (FLEXERIL) 10 MG TABLET    TAKE 1 TABLET BY MOUTH THREE TIMES A DAY AS NEEDED FOR MUSCLE SPASMS    DOCUSATE SODIUM (COLACE) 100 MG CAPSULE    Take 100 mg by mouth 2 (two) times daily.    ERGOCALCIFEROL (ERGOCALCIFEROL) 50,000 UNIT CAP    Take 1 capsule (50,000 Units total) by mouth every 7 days.    FUROSEMIDE (LASIX) 20 MG TABLET    Take 20 mg by mouth daily as needed.    HYDROCODONE-ACETAMINOPHEN (HYCET) SOLUTION 7.5-325 MG/15ML    TK 15 ML PO Q 6 H PRN P    KETOCONAZOLE (NIZORAL) 2 % CREAM        KETOCONAZOLE (NIZORAL) 2 % SHAMPOO        LEVOTHYROXINE (SYNTHROID) 137 MCG TAB TABLET    Take 1 tablet (137 mcg total) by mouth once daily at 6am.    LOMAIRA 8 MG TAB    Can take up to three pills daily if needed for appetite control    MEDROXYPROGESTERONE (PROVERA) 10 MG TABLET    Take 2 tablets (20 mg total) by mouth 2 (two) times a day.    METFORMIN (GLUCOPHAGE-XR) 500 MG 24 HR TABLET    2 tablets twice daily    MULTIVITAMIN CAPSULE    Take 1 capsule by mouth once daily.    ONDANSETRON (ZOFRAN-ODT) 8 MG TBDL    TK 1 T PO Q 8 H PRN  FOR  NAUSEA    OXYCODONE-ACETAMINOPHEN (PERCOCET) 5-325 MG PER TABLET    TAKE 1 TO 2 TABLETS BY MOUTH EVERY 4 TO 6 HOURS PRN P    PANTOPRAZOLE (PROTONIX) 40 MG TABLET    TAKE 1 TABLET BY MOUTH TWICE A DAY    POTASSIUM CHLORIDE (KLOR-CON) 10 MEQ TBSR        TIROSINT 150 MCG CAP    Take 1 capsule by mouth every morning.    TRIAMTERENE-HYDROCHLOROTHIAZIDE 37.5-25 MG (MAXZIDE-25) 37.5-25 MG PER TABLET    Take  1 tablet by mouth once daily.    VALACYCLOVIR (VALTREX) 1000 MG TABLET    TK 1 T PO ONCE D    ZINC SULFATE (ZINC-15 ORAL)    Take by mouth.     Objective:   There were no vitals filed for this visit.  Lab Results   Component Value Date    WBC 3.90 07/05/2024    HGB 13.6 07/05/2024    HCT 41.0 07/05/2024    MCV 94 07/05/2024     07/05/2024       BMP  Lab Results   Component Value Date     04/15/2024    K 3.9 04/15/2024    CL 98 04/15/2024    CO2 26 04/15/2024    BUN 12 04/15/2024    CREATININE 0.84 04/15/2024    CALCIUM 9.5 04/15/2024    ANIONGAP 11 04/01/2024    ESTGFRAFRICA >60 08/20/2021    EGFRNONAA 87 01/31/2022     Lab Results   Component Value Date    ALT 16 04/15/2024    AST 22 04/15/2024    GGT 46 11/15/2018    ALKPHOS 52 07/17/2020    BILITOT 0.2 04/15/2024       Physical Exam  Pulmonary:      Effort: Pulmonary effort is normal. No respiratory distress.   Neurological:      Mental Status: She is alert and oriented to person, place, and time.        Assessment:     Problem List Items Addressed This Visit          Oncology    Iron deficiency anemia - Primary     No anemia. Saturated iron and ferritin normal. TIBC remains elevated. She reports ongoing irregular menstrual cycles with GYN follow up. Reports some improvement in menorrhagia. Upcoming interventional Radiology visit to discuss treatment of fibroids     F/u 3 months with cbc, iron, ferritin. Discussed S&S to report sooner         Relevant Orders    CBC Auto Differential    Iron and TIBC    Ferritin    Vitamin B12    Folate       Endocrine    H/O bariatric surgery    Relevant Orders    CBC Auto Differential    Iron and TIBC    Ferritin    Vitamin B12    Folate         Plan:     Iron deficiency anemia due to chronic blood loss  -     CBC Auto Differential; Future; Expected date: 07/11/2024  -     Iron and TIBC; Future; Expected date: 07/11/2024  -     Ferritin; Future; Expected date: 07/11/2024  -     Vitamin B12; Future; Expected date:  07/11/2024  -     Folate; Future; Expected date: 07/11/2024    H/O bariatric surgery  -     CBC Auto Differential; Future; Expected date: 07/11/2024  -     Iron and TIBC; Future; Expected date: 07/11/2024  -     Ferritin; Future; Expected date: 07/11/2024  -     Vitamin B12; Future; Expected date: 07/11/2024  -     Folate; Future; Expected date: 07/11/2024        Med Onc Chart Routing      Follow up with physician    Follow up with [facility] 3 months.   Infusion scheduling note    Injection scheduling note    Labs CBC, ferritin, iron and TIBC, vitamin B12 and folate   Scheduling:  Preferred lab:  Lab interval:  1-2 days prior   Imaging None      Pharmacy appointment No pharmacy appointment needed      Other referrals       No additional referrals needed               SRI LeavittC

## 2024-10-09 ENCOUNTER — LAB VISIT (OUTPATIENT)
Dept: LAB | Facility: HOSPITAL | Age: 44
End: 2024-10-09
Attending: NURSE PRACTITIONER
Payer: COMMERCIAL

## 2024-10-09 DIAGNOSIS — Z98.84 H/O BARIATRIC SURGERY: ICD-10-CM

## 2024-10-09 DIAGNOSIS — D50.0 IRON DEFICIENCY ANEMIA DUE TO CHRONIC BLOOD LOSS: ICD-10-CM

## 2024-10-09 LAB
BASOPHILS # BLD AUTO: 0.04 K/UL (ref 0–0.2)
BASOPHILS NFR BLD: 1 % (ref 0–1.9)
DIFFERENTIAL METHOD BLD: ABNORMAL
EOSINOPHIL # BLD AUTO: 0.1 K/UL (ref 0–0.5)
EOSINOPHIL NFR BLD: 2 % (ref 0–8)
ERYTHROCYTE [DISTWIDTH] IN BLOOD BY AUTOMATED COUNT: 14.6 % (ref 11.5–14.5)
FERRITIN SERPL-MCNC: 12 NG/ML (ref 20–300)
FOLATE SERPL-MCNC: 5.5 NG/ML (ref 4–24)
HCT VFR BLD AUTO: 36.1 % (ref 37–48.5)
HGB BLD-MCNC: 12 G/DL (ref 12–16)
IMM GRANULOCYTES # BLD AUTO: 0.01 K/UL (ref 0–0.04)
IMM GRANULOCYTES NFR BLD AUTO: 0.3 % (ref 0–0.5)
IRON SERPL-MCNC: 46 UG/DL (ref 30–160)
LYMPHOCYTES # BLD AUTO: 1.7 K/UL (ref 1–4.8)
LYMPHOCYTES NFR BLD: 42.6 % (ref 18–48)
MCH RBC QN AUTO: 30.2 PG (ref 27–31)
MCHC RBC AUTO-ENTMCNC: 33.2 G/DL (ref 32–36)
MCV RBC AUTO: 91 FL (ref 82–98)
MONOCYTES # BLD AUTO: 0.5 K/UL (ref 0.3–1)
MONOCYTES NFR BLD: 11.3 % (ref 4–15)
NEUTROPHILS # BLD AUTO: 1.7 K/UL (ref 1.8–7.7)
NEUTROPHILS NFR BLD: 42.8 % (ref 38–73)
NRBC BLD-RTO: 0 /100 WBC
PLATELET # BLD AUTO: 298 K/UL (ref 150–450)
PMV BLD AUTO: 8.2 FL (ref 9.2–12.9)
RBC # BLD AUTO: 3.97 M/UL (ref 4–5.4)
SATURATED IRON: 8 % (ref 20–50)
TOTAL IRON BINDING CAPACITY: 542 UG/DL (ref 250–450)
TRANSFERRIN SERPL-MCNC: 366 MG/DL (ref 200–375)
VIT B12 SERPL-MCNC: 607 PG/ML (ref 210–950)
WBC # BLD AUTO: 3.99 K/UL (ref 3.9–12.7)

## 2024-10-09 PROCEDURE — 82746 ASSAY OF FOLIC ACID SERUM: CPT | Performed by: NURSE PRACTITIONER

## 2024-10-09 PROCEDURE — 84466 ASSAY OF TRANSFERRIN: CPT | Performed by: NURSE PRACTITIONER

## 2024-10-09 PROCEDURE — 83540 ASSAY OF IRON: CPT | Performed by: NURSE PRACTITIONER

## 2024-10-09 PROCEDURE — 36415 COLL VENOUS BLD VENIPUNCTURE: CPT | Performed by: NURSE PRACTITIONER

## 2024-10-09 PROCEDURE — 85025 COMPLETE CBC W/AUTO DIFF WBC: CPT | Performed by: NURSE PRACTITIONER

## 2024-10-09 PROCEDURE — 82607 VITAMIN B-12: CPT | Performed by: NURSE PRACTITIONER

## 2024-10-09 PROCEDURE — 82728 ASSAY OF FERRITIN: CPT | Performed by: NURSE PRACTITIONER

## 2024-10-10 ENCOUNTER — TELEPHONE (OUTPATIENT)
Dept: HEMATOLOGY/ONCOLOGY | Facility: CLINIC | Age: 44
End: 2024-10-10
Payer: COMMERCIAL

## 2024-10-10 NOTE — TELEPHONE ENCOUNTER
DATE OF ADMISSION:  09/05/2019



DATE OF DISCHARGE:  09/08/2019



DISCHARGE DISPOSITION:  Home.



PRIMARY DISCHARGE DIAGNOSES:  

1. Herpes zoster.

2. Lactic acidosis.

3. Acute on chronic kidney failure, baseline chronic kidney disease, stage 3.



SECONDARY DISCHARGE DIAGNOSES:  

1. Anxiety and depression.

2. Chronic kidney disease, stage 3.

3. Gastroesophageal reflux disease.

4. Hypertension.

5. Hypothyroidism.

6. Obstructive sleep apnea.

7. Rheumatoid arthritis.

8. Obesity with BMI 35.

9. Chronic microcytic anemia.



PRIMARY PROCEDURE/OPERATION:  None.



RADIOLOGICAL INVESTIGATION:  Chest x-ray normal.



SIGNIFICANT LABORATORY DATA:  WBC 5.2, hemoglobin 10.4, and platelet 229.  Sodium

138, potassium 3.6, BUN 23, creatinine 1.16, and calcium 9.1.  LFT normal.

Urinalysis normal. 



DISCHARGE MEDICATIONS:  

1. Orencia 250 mg IV per bag as directed.

2. Norvasc 5 mg daily.

3. Vitamin D3 of 1000 units daily.

4. Cranberry two tablet daily.

5. Lexapro 20 mg daily.

6. Iron one tablet daily.

7. Lasix 20 mg daily.

8. Arava 20 mg daily.

9. Synthroid 100 mcg daily.

10. Losartan 100 mg daily.

11. Multivitamin one tablet daily.

12. Protonix 40 mg daily.

13. Prednisone 9 mg daily.

14. Ultracet 2 tablet t.i.d. p.r.n.

15. Gabapentin 100 mg t.i.d.

16. Valtrex 1 g p.o. b.i.d. for 5 days.

17. Magic mouthwash prescription given to use as directed.



CONTRAINDICATION:  None.



CODE STATUS:  Full code.



INPATIENT CONSULTANT:  Dr. Jackson, ID doctor.



TEST RESULTS PENDING ON DISCHARGE:  None.



ALLERGIES:  LEVAQUIN, SULFA DRUGS.



DISCHARGE PLAN:  Posthospital, the patient will follow up with primary care

physician in 1 or 2 weeks. 



HOSPITAL COURSE:  An 79-year-old female, who has above-mentioned medical problem,

who was admitted by Dr. Mondragon.  Please see his H and P for further details.  The

patient came to emergency room with fever, fatigue, and a rash on face.  She had

mild lactic acidosis that was improved with IV fluid.  She also had acute on chronic

kidney failure.  Initially, the patient was suspected for infection and that is why

antibiotic was started, but Dr. Jackson was consulted for rash and he thought that

this patient has herpes zoster and antibiotic was discontinued.  The patient was

treated with Valtrex while in hospital.  This patient has neurogenic bladder and she

is doing in and out catheter at home, but while in hospital, she required chronic

indwelling Anderson catheter.  We continued all her previous medication while in

hospital.  The patient does not have any eye involvement or ear involvement.  The

patient responded very well to Valtrex therapy. 



Overall, the patient is medically stable for discharge today and she will follow up

with primary care physician as well as Dr. Jackson as instructed. 



PHYSICAL EXAMINATION:

VITAL SIGNS:  Today, temperature 98.2, pulse 83, blood pressure 169/79, saturation

93% on room air, and respiratory rate 20.  Weight 198 pounds. 

GENERAL:  The patient is currently alert, awake, no acute distress. 

HEENT:  Head; normocephalic, atraumatic. 

LUNGS:  Clear to auscultation without any rhonchi or rales. 

CARDIAC:  S1 and S2.  Regular without any murmur. 

ABDOMEN:  Soft and benign. 

EXTREMITIES:  No edema. 

NEUROLOGIC:  Nonfocal examination. 

SKIN:  The patient's zoster rash is improving without any superinfection. 

NEUROLOGIC:  Nonfocal examination. 



The patient is medically stable for discharge today.







Job ID:  [id number] Returned call to patient and stated her visit has been switched to a virtual.

## 2024-10-10 NOTE — TELEPHONE ENCOUNTER
----- Message from Lola sent at 10/10/2024  2:47 PM CDT -----  Contact: lotus Kennedy is requesting a call back in regards to getting her apt on tomorrow converted into a cell phone apt   Please give her a call back at [phone]

## 2024-10-11 ENCOUNTER — OFFICE VISIT (OUTPATIENT)
Dept: HEMATOLOGY/ONCOLOGY | Facility: CLINIC | Age: 44
End: 2024-10-11
Payer: COMMERCIAL

## 2024-10-11 DIAGNOSIS — D50.0 IRON DEFICIENCY ANEMIA DUE TO CHRONIC BLOOD LOSS: Primary | ICD-10-CM

## 2024-10-11 NOTE — ASSESSMENT & PLAN NOTE
Recurrent iron deficiency. No anemia. She reports ongoing irregular menstrual cycles + menorrhagia with GYN follow up. Planned fibroid procedure 12/2024. No prior colonoscopy. She would like to alleviate fibroid concerns prior to proceeding with Colonoscopy      Arrange weekly Feraheme x 2. F/u 2 months with cbc, iron, ferritin. Discussed S&S to report sooner

## 2024-10-11 NOTE — PROGRESS NOTES
Subjective:       Patient ID: Marcia Hazel is a 44 y.o. female.    Chief Complaint: f/u h/o JANET    HPI: 44-year-old woman with history of obesity status post gastric sleeve 2016 with hiatal hernia and GERD.  She also has history of menorrhagia and has been following with Gynecology.  She notes prior use of estrogen containing oral contraceptive and has been off since May. She does note history of right lower extremity DVT in calf in the setting of immobility after a surgery and completed about 6 months of Eliquis.     She had been followed by Dr. Beckford, outside hematologist/oncologist for history of iron deficiency anemia.  She notes in the past receiving 8 doses of IV iron and more recently in August receive 4 more doses of IV iron therapy with subsequent lab work showing mild anemia hemoglobin 11, normal saturation, TIBC, transferrin.  No ferritin checked.  Patient also notes her outside oncologist had concerns about white blood cell count however per these labs within normal limits and normal differential.     She reports h/o mammogram for breast cancer screening which she reports is benign.  She has never had colonoscopy.  No family history of malignancy.    Today's visit:  Patient presenting today for follow up of her JANET. She feels well but still with ongoing menstrual cycle issues. Procedure planned 12/2024. She notes fatigue.     The patient location is: car  The chief complaint leading to consultation is: f/u JANET    Visit type: audiovisual    Face to Face time with patient: 10 minutes  25 minutes of total time spent on the encounter, which includes face to face time and non-face to face time preparing to see the patient (eg, review of tests), Obtaining and/or reviewing separately obtained history, Documenting clinical information in the electronic or other health record, Independently interpreting results (not separately reported) and communicating results to the patient/family/caregiver, or Care  coordination (not separately reported).         Each patient to whom he or she provides medical services by telemedicine is:  (1) informed of the relationship between the physician and patient and the respective role of any other health care provider with respect to management of the patient; and (2) notified that he or she may decline to receive medical services by telemedicine and may withdraw from such care at any time.    Notes:        Social History     Socioeconomic History    Marital status: Single   Tobacco Use    Smoking status: Never    Smokeless tobacco: Never   Substance and Sexual Activity    Alcohol use: Yes     Comment: social    Drug use: No    Sexual activity: Yes     Social Drivers of Health     Financial Resource Strain: Low Risk  (1/28/2024)    Overall Financial Resource Strain (CARDIA)     Difficulty of Paying Living Expenses: Not very hard   Food Insecurity: No Food Insecurity (1/28/2024)    Hunger Vital Sign     Worried About Running Out of Food in the Last Year: Never true     Ran Out of Food in the Last Year: Never true   Transportation Needs: No Transportation Needs (1/28/2024)    PRAPARE - Transportation     Lack of Transportation (Medical): No     Lack of Transportation (Non-Medical): No   Physical Activity: Inactive (1/28/2024)    Exercise Vital Sign     Days of Exercise per Week: 0 days     Minutes of Exercise per Session: 0 min   Stress: No Stress Concern Present (1/28/2024)    [employer] [place] [employer] - Occupational Stress Questionnaire     Feeling of Stress : Not at all   Housing Stability: Low Risk  (1/28/2024)    Housing Stability Vital Sign     Unable to Pay for Housing in the Last Year: No     Number of Places Lived in the Last Year: 1     Unstable Housing in the Last Year: No       Past Medical History:   Diagnosis Date    Acquired hypothyroidism     Benign hypertension     Deep vein thrombosis 08/2019    right leg    Dysmetabolic syndrome X 11/28/2016 3:39:32 PM     [facility] Historical - DO NOT USE: Insulin resistance-No Additional Notes    Encounter for general adult medical examination without abnormal findings 04/10/2017    Essential hypertension 11/28/2016 3:39:24 PM    [facility] Historical - Cardiovascular: Hypertension-No Additional Notes    GERD (gastroesophageal reflux disease)     Hyperlipidemia     Hyperlipidemia     Hypothyroidism 11/28/2016 3:39:29 PM    [facility] Historical - DO NOT USE: Hypothyroidism-No Additional Notes    Iron deficiency anemia     Iron deficiency anemia 1/14/2021 9:03:48 AM    [facility] Historical - Unknown: Iron deficiency-No Additional Notes    Metabolic syndrome X     ANTONIA on CPAP     Postsurgical malabsorption        Family History   Problem Relation Name Age of Onset    Diabetes Mother      Hypertension Mother         Past Surgical History:   Procedure Laterality Date    BELT ABDOMINOPLASTY      BUNIONECTOMY Right     GASTROSTOMY      partial    HERNIA REPAIR      MYOMECTOMY         Review of Systems   Constitutional:  Positive for fatigue. Negative for activity change, appetite change, chills, fever and unexpected weight change.   HENT:  Negative for congestion, mouth sores, nosebleeds, sore throat, trouble swallowing and voice change.    Eyes:  Negative for photophobia.   Respiratory:  Negative for cough, chest tightness, shortness of breath and wheezing.    Cardiovascular:  Negative for chest pain and leg swelling.   Gastrointestinal:  Negative for abdominal distention, abdominal pain, blood in stool, constipation, diarrhea, nausea and vomiting.   Genitourinary:  Positive for menstrual problem. Negative for difficulty urinating, dysuria and hematuria.   Musculoskeletal:  Negative for arthralgias, back pain and myalgias.   Skin:  Negative for pallor, rash and wound.   Neurological:  Negative for dizziness, syncope, weakness and headaches.   Hematological:  Negative for adenopathy. Does not bruise/bleed easily.    Psychiatric/Behavioral:  The patient is not nervous/anxious.        Medication List with Changes/Refills   Current Medications    AMLODIPINE (NORVASC) 10 MG TABLET    Take 1 tablet (10 mg total) by mouth once daily.    ASCORBIC ACID, VITAMIN C, (VITAMIN C) 100 MG TABLET    Take 100 mg by mouth once daily.    CARVEDILOL (COREG) 25 MG TABLET    Take 1 tablet (25 mg total) by mouth 2 (two) times daily with meals.    CHLORHEXIDINE (PERIDEX) 0.12 % SOLUTION        CYCLOBENZAPRINE (FLEXERIL) 10 MG TABLET    Take 1 tablet (10 mg total) by mouth 3 (three) times daily as needed for Muscle spasms. TAKE 1 TABLET BY MOUTH THREE TIMES A DAY AS NEEDED FOR MUSCLE SPASMS    DOCUSATE SODIUM (COLACE) 100 MG CAPSULE    Take 100 mg by mouth 2 (two) times daily.    ERGOCALCIFEROL (ERGOCALCIFEROL) 50,000 UNIT CAP    Take 1 capsule (50,000 Units total) by mouth every 7 days.    FUROSEMIDE (LASIX) 20 MG TABLET    Take 20 mg by mouth daily as needed.    HYDROCODONE-ACETAMINOPHEN (HYCET) SOLUTION 7.5-325 MG/15ML    TK 15 ML PO Q 6 H PRN P    KETOCONAZOLE (NIZORAL) 2 % CREAM        KETOCONAZOLE (NIZORAL) 2 % SHAMPOO        LEVOTHYROXINE (TIROSINT) 25 MCG CAP    Take 1 capsule by mouth Daily.    LOMAIRA 8 MG TAB        MEDROXYPROGESTERONE (PROVERA) 10 MG TABLET    TAKE 2 TABLETS BY MOUTH 2 TIMES A DAY.    METFORMIN (GLUCOPHAGE-XR) 500 MG 24 HR TABLET    2 tablets twice daily    MULTIVITAMIN CAPSULE    Take 1 capsule by mouth once daily.    ONDANSETRON (ZOFRAN-ODT) 8 MG TBDL        OXYCODONE-ACETAMINOPHEN (PERCOCET) 5-325 MG PER TABLET        PANTOPRAZOLE (PROTONIX) 40 MG TABLET    TAKE 1 TABLET BY MOUTH TWICE A DAY    POTASSIUM CHLORIDE (KLOR-CON) 10 MEQ TBSR        TIROSINT 150 MCG CAP    Take 1 capsule by mouth every morning.    TRIAMTERENE-HYDROCHLOROTHIAZIDE 37.5-25 MG (MAXZIDE-25) 37.5-25 MG PER TABLET    TAKE 1 TABLET BY MOUTH EVERY DAY    VALACYCLOVIR (VALTREX) 1000 MG TABLET        ZINC SULFATE (ZINC-15 ORAL)    Take by mouth.      Objective:   There were no vitals filed for this visit.  Lab Results   Component Value Date    WBC 3.99 10/09/2024    HGB 12.0 10/09/2024    HCT 36.1 (L) 10/09/2024    MCV 91 10/09/2024     10/09/2024       BMP  Lab Results   Component Value Date     09/26/2024    K 3.9 09/26/2024    CL 99 09/26/2024    CO2 29 09/26/2024    BUN 14 09/26/2024    CREATININE 0.74 09/26/2024    CALCIUM 9.1 09/26/2024    ANIONGAP 11 04/01/2024    ESTGFRAFRICA >60 08/20/2021    EGFRNONAA 87 01/31/2022     Lab Results   Component Value Date    ALT 21 09/26/2024    AST 28 09/26/2024    GGT 46 11/15/2018    ALKPHOS 52 07/17/2020    BILITOT 0.3 09/26/2024       Physical Exam  Pulmonary:      Effort: Pulmonary effort is normal. No respiratory distress.   Neurological:      Mental Status: She is alert and oriented to person, place, and time.        Assessment:     Problem List Items Addressed This Visit          Oncology    Iron deficiency anemia - Primary     Recurrent iron deficiency. No anemia. She reports ongoing irregular menstrual cycles + menorrhagia with GYN follow up. Planned fibroid procedure 12/2024. No prior colonoscopy. She would like to alleviate fibroid concerns prior to proceeding with Colonoscopy      Arrange weekly Feraheme x 2. F/u 2 months with cbc, iron, ferritin. Discussed S&S to report sooner         Relevant Orders    CBC Auto Differential    Iron and TIBC    Ferritin    Pregnancy, urine rapid         Plan:     Iron deficiency anemia due to chronic blood loss  -     CBC Auto Differential; Future; Expected date: 10/11/2024  -     Iron and TIBC; Future; Expected date: 10/11/2024  -     Ferritin; Future; Expected date: 10/11/2024  -     Pregnancy, urine rapid; Future; Expected date: 10/11/2024        Med Onc Chart Routing      Follow up with physician    Follow up with [facility] 2 months.   Infusion scheduling note   Feraheme weekly x 2 (urine preg test prior)   Injection scheduling note    Labs CBC, ferritin,  iron and TIBC and other   Scheduling:  Preferred lab:  Lab interval:  1-2 days prior   Imaging None      Pharmacy appointment No pharmacy appointment needed      Other referrals       No additional referrals needed               DONALDO Leavitt-C

## 2024-10-18 RX ORDER — HEPARIN 100 UNIT/ML
500 SYRINGE INTRAVENOUS
OUTPATIENT
Start: 2024-10-18

## 2024-10-18 RX ORDER — HEPARIN 100 UNIT/ML
500 SYRINGE INTRAVENOUS
OUTPATIENT
Start: 2024-10-25

## 2024-10-18 RX ORDER — SODIUM CHLORIDE 0.9 % (FLUSH) 0.9 %
10 SYRINGE (ML) INJECTION
OUTPATIENT
Start: 2024-10-25

## 2024-10-18 RX ORDER — SODIUM CHLORIDE 0.9 % (FLUSH) 0.9 %
10 SYRINGE (ML) INJECTION
OUTPATIENT
Start: 2024-10-18

## 2024-10-28 ENCOUNTER — INFUSION (OUTPATIENT)
Dept: INFUSION THERAPY | Facility: HOSPITAL | Age: 44
End: 2024-10-28
Attending: INTERNAL MEDICINE
Payer: COMMERCIAL

## 2024-10-28 ENCOUNTER — LAB VISIT (OUTPATIENT)
Dept: LAB | Facility: HOSPITAL | Age: 44
End: 2024-10-28
Attending: INTERNAL MEDICINE
Payer: COMMERCIAL

## 2024-10-28 VITALS
DIASTOLIC BLOOD PRESSURE: 86 MMHG | SYSTOLIC BLOOD PRESSURE: 138 MMHG | HEIGHT: 62 IN | OXYGEN SATURATION: 93 % | HEART RATE: 98 BPM | WEIGHT: 250.25 LBS | TEMPERATURE: 98 F | BODY MASS INDEX: 46.05 KG/M2 | RESPIRATION RATE: 18 BRPM

## 2024-10-28 DIAGNOSIS — D50.8 OTHER IRON DEFICIENCY ANEMIA: Primary | ICD-10-CM

## 2024-10-28 DIAGNOSIS — D50.0 IRON DEFICIENCY ANEMIA DUE TO CHRONIC BLOOD LOSS: ICD-10-CM

## 2024-10-28 LAB — B-HCG UR QL: NEGATIVE

## 2024-10-28 PROCEDURE — 63600175 PHARM REV CODE 636 W HCPCS: Mod: JZ,JG | Performed by: NURSE PRACTITIONER

## 2024-10-28 PROCEDURE — 81025 URINE PREGNANCY TEST: CPT | Performed by: NURSE PRACTITIONER

## 2024-10-28 PROCEDURE — 25000003 PHARM REV CODE 250: Performed by: NURSE PRACTITIONER

## 2024-10-28 PROCEDURE — 96374 THER/PROPH/DIAG INJ IV PUSH: CPT

## 2024-10-28 RX ORDER — SODIUM CHLORIDE 0.9 % (FLUSH) 0.9 %
10 SYRINGE (ML) INJECTION
Status: DISCONTINUED | OUTPATIENT
Start: 2024-10-28 | End: 2024-10-28 | Stop reason: HOSPADM

## 2024-10-28 RX ORDER — HEPARIN 100 UNIT/ML
500 SYRINGE INTRAVENOUS
Status: DISCONTINUED | OUTPATIENT
Start: 2024-10-28 | End: 2024-10-28 | Stop reason: HOSPADM

## 2024-10-28 RX ADMIN — FERUMOXYTOL 510 MG: 510 INJECTION INTRAVENOUS at 08:10

## 2024-11-04 ENCOUNTER — INFUSION (OUTPATIENT)
Dept: INFUSION THERAPY | Facility: HOSPITAL | Age: 44
End: 2024-11-04
Attending: NURSE PRACTITIONER
Payer: COMMERCIAL

## 2024-11-04 VITALS
SYSTOLIC BLOOD PRESSURE: 128 MMHG | TEMPERATURE: 97 F | DIASTOLIC BLOOD PRESSURE: 87 MMHG | OXYGEN SATURATION: 93 % | RESPIRATION RATE: 18 BRPM | HEART RATE: 89 BPM

## 2024-11-04 DIAGNOSIS — D50.8 OTHER IRON DEFICIENCY ANEMIA: Primary | ICD-10-CM

## 2024-11-04 PROCEDURE — 25000003 PHARM REV CODE 250: Performed by: NURSE PRACTITIONER

## 2024-11-04 PROCEDURE — 63600175 PHARM REV CODE 636 W HCPCS: Mod: JZ,JG | Performed by: NURSE PRACTITIONER

## 2024-11-04 PROCEDURE — 96374 THER/PROPH/DIAG INJ IV PUSH: CPT

## 2024-11-04 RX ORDER — SODIUM CHLORIDE 0.9 % (FLUSH) 0.9 %
10 SYRINGE (ML) INJECTION
Status: DISCONTINUED | OUTPATIENT
Start: 2024-11-04 | End: 2024-11-04 | Stop reason: HOSPADM

## 2024-11-04 RX ADMIN — FERUMOXYTOL 510 MG: 510 INJECTION INTRAVENOUS at 08:11

## 2024-11-04 NOTE — PLAN OF CARE
Discussed plan of care with pt. Addressed any and ongoing concerns. Pt denies   Problem: Adult Inpatient Plan of Care  Goal: Plan of Care Review  Outcome: Progressing  Goal: Patient-Specific Goal (Individualized)  Outcome: Progressing  Flowsheets (Taken 11/4/2024 0843)  Individualized Care Needs: Reclined position, pillow for IV arm IV to LAC  Anxieties, Fears or Concerns: denies  Patient/Family-Specific Goals (Include Timeframe): will continue to tolerate iron infusion  Goal: Absence of Hospital-Acquired Illness or Injury  Outcome: Progressing  Intervention: Identify and Manage Fall Risk  Flowsheets (Taken 11/4/2024 0843)  Safety Promotion/Fall Prevention: in recliner, wheels locked  Intervention: Prevent Infection  Flowsheets (Taken 11/4/2024 0843)  Infection Prevention:   equipment surfaces disinfected   hand hygiene promoted   personal protective equipment utilized

## 2024-11-04 NOTE — DISCHARGE INSTRUCTIONS
[facility]  [address] [address]  [address]  [phone] phone     [phone] fax  Hours of Operation: Monday- Friday 8:00am- 5:00pm  After hours phone  [phone]  Hematology / Oncology Physicians on call      KIRK Mcdaniel Dr., NP Phaon Dunbar, NP Khelsea Conley, FNP    Please call with any concerns regarding your appointment today.

## 2025-01-03 ENCOUNTER — LAB VISIT (OUTPATIENT)
Dept: LAB | Facility: HOSPITAL | Age: 45
End: 2025-01-03
Attending: INTERNAL MEDICINE
Payer: COMMERCIAL

## 2025-01-03 DIAGNOSIS — D50.0 IRON DEFICIENCY ANEMIA DUE TO CHRONIC BLOOD LOSS: ICD-10-CM

## 2025-01-03 LAB
BASOPHILS # BLD AUTO: 0.02 K/UL (ref 0–0.2)
BASOPHILS NFR BLD: 0.3 % (ref 0–1.9)
DIFFERENTIAL METHOD BLD: ABNORMAL
EOSINOPHIL # BLD AUTO: 0.2 K/UL (ref 0–0.5)
EOSINOPHIL NFR BLD: 3.4 % (ref 0–8)
ERYTHROCYTE [DISTWIDTH] IN BLOOD BY AUTOMATED COUNT: 17.8 % (ref 11.5–14.5)
FERRITIN SERPL-MCNC: 251 NG/ML (ref 20–300)
HCT VFR BLD AUTO: 42.8 % (ref 37–48.5)
HGB BLD-MCNC: 13.8 G/DL (ref 12–16)
IMM GRANULOCYTES # BLD AUTO: 0.02 K/UL (ref 0–0.04)
IMM GRANULOCYTES NFR BLD AUTO: 0.3 % (ref 0–0.5)
LYMPHOCYTES # BLD AUTO: 1.3 K/UL (ref 1–4.8)
LYMPHOCYTES NFR BLD: 21.4 % (ref 18–48)
MCH RBC QN AUTO: 31.1 PG (ref 27–31)
MCHC RBC AUTO-ENTMCNC: 32.2 G/DL (ref 32–36)
MCV RBC AUTO: 96 FL (ref 82–98)
MONOCYTES # BLD AUTO: 0.7 K/UL (ref 0.3–1)
MONOCYTES NFR BLD: 10.7 % (ref 4–15)
NEUTROPHILS # BLD AUTO: 4 K/UL (ref 1.8–7.7)
NEUTROPHILS NFR BLD: 63.9 % (ref 38–73)
NRBC BLD-RTO: 0 /100 WBC
PLATELET # BLD AUTO: 295 K/UL (ref 150–450)
PMV BLD AUTO: 8.4 FL (ref 9.2–12.9)
RBC # BLD AUTO: 4.44 M/UL (ref 4–5.4)
WBC # BLD AUTO: 6.18 K/UL (ref 3.9–12.7)

## 2025-01-03 PROCEDURE — 36415 COLL VENOUS BLD VENIPUNCTURE: CPT | Performed by: NURSE PRACTITIONER

## 2025-01-03 PROCEDURE — 82728 ASSAY OF FERRITIN: CPT | Performed by: NURSE PRACTITIONER

## 2025-01-03 PROCEDURE — 85025 COMPLETE CBC W/AUTO DIFF WBC: CPT | Performed by: NURSE PRACTITIONER

## 2025-01-03 PROCEDURE — 83540 ASSAY OF IRON: CPT | Performed by: NURSE PRACTITIONER

## 2025-01-04 LAB
IRON SERPL-MCNC: 47 UG/DL (ref 30–160)
SATURATED IRON: 12 % (ref 20–50)
TOTAL IRON BINDING CAPACITY: 389 UG/DL (ref 250–450)
TRANSFERRIN SERPL-MCNC: 263 MG/DL (ref 200–375)

## 2025-01-10 ENCOUNTER — OFFICE VISIT (OUTPATIENT)
Dept: HEMATOLOGY/ONCOLOGY | Facility: CLINIC | Age: 45
End: 2025-01-10
Payer: COMMERCIAL

## 2025-01-10 DIAGNOSIS — D50.0 IRON DEFICIENCY ANEMIA DUE TO CHRONIC BLOOD LOSS: Primary | ICD-10-CM

## 2025-01-10 NOTE — ASSESSMENT & PLAN NOTE
Interval improvement in iron deficiency. Interval rise in previously normal hemoglobin. Saturated iron 12% other iron labs wnl. Patient s/p uterine fibroid embolization     For now will hold off on additional IV iron. Arrange 2 months f/u with cbc iron ferritin

## 2025-01-10 NOTE — PROGRESS NOTES
Subjective:       Patient ID: Marcia Hazel is a 44 y.o. female.    Chief Complaint: f/u h/o JANET    HPI: 44-year-old woman with history of obesity status post gastric sleeve 2016 with hiatal hernia and GERD.  She also has history of menorrhagia and has been following with Gynecology.  She notes prior use of estrogen containing oral contraceptive and has been off since May. She does note history of right lower extremity DVT in calf in the setting of immobility after a surgery and completed about 6 months of Eliquis.     She had been followed by Dr. Beckford, outside hematologist/oncologist for history of iron deficiency anemia.  She notes in the past receiving 8 doses of IV iron and more recently in August receive 4 more doses of IV iron therapy with subsequent lab work showing mild anemia hemoglobin 11, normal saturation, TIBC, transferrin.  No ferritin checked.  Patient also notes her outside oncologist had concerns about white blood cell count however per these labs within normal limits and normal differential.     She reports h/o mammogram for breast cancer screening which she reports is benign.  She has never had colonoscopy.  No family history of malignancy.    Today's visit:  Patient presenting today for follow up of her JANET. Interval uterine fibroid emobolization 12/2024. She reports too soon to tell if menorrhagia improved. She notes ongoing fatigue.     The patient location is: home  The chief complaint leading to consultation is: f/u JANET    Visit type: audiovisual    Face to Face time with patient: 6 minutes  20 minutes of total time spent on the encounter, which includes face to face time and non-face to face time preparing to see the patient (eg, review of tests), Obtaining and/or reviewing separately obtained history, Documenting clinical information in the electronic or other health record, Independently interpreting results (not separately reported) and communicating results to the  patient/family/caregiver, or Care coordination (not separately reported).         Each patient to whom he or she provides medical services by telemedicine is:  (1) informed of the relationship between the physician and patient and the respective role of any other health care provider with respect to management of the patient; and (2) notified that he or she may decline to receive medical services by telemedicine and may withdraw from such care at any time.    Notes:        Social History     Socioeconomic History    Marital status: Single   Tobacco Use    Smoking status: Never    Smokeless tobacco: Never   Substance and Sexual Activity    Alcohol use: Yes     Comment: social    Drug use: No    Sexual activity: Yes     Social Drivers of Health     Financial Resource Strain: Low Risk  (12/11/2024)    Received from [facility]    Overall Financial Resource Strain (CARDIA)     Difficulty of Paying Living Expenses: Not hard at all   Food Insecurity: No Food Insecurity (12/11/2024)    Received from [facility]    Hunger Vital Sign     Worried About Running Out of Food in the Last Year: Never true     Ran Out of Food in the Last Year: Never true   Transportation Needs: No Transportation Needs (12/11/2024)    Received from [facility]    PRAPARE - Transportation     Lack of Transportation (Medical): No     Lack of Transportation (Non-Medical): No   Physical Activity: Inactive (1/28/2024)    Exercise Vital Sign     Days of Exercise per Week: 0 days     Minutes of Exercise per Session: 0 min   Stress: No Stress Concern Present (1/28/2024)    [employer] [place] [employer] - Occupational Stress Questionnaire     Feeling of Stress : Not at all   Housing Stability: Unknown (12/11/2024)    Received from [facility]    Housing Stability Vital Sign     Unable to Pay for Housing in the Last Year: No     Homeless in the Last Year: No       Past Medical History:   Diagnosis Date    Acquired hypothyroidism      Benign hypertension     Deep vein thrombosis 08/2019    right leg    Dysmetabolic syndrome X 11/28/2016 3:39:32 PM    [facility] Historical - DO NOT USE: Insulin resistance-No Additional Notes    Encounter for general adult medical examination without abnormal findings 04/10/2017    Essential hypertension 11/28/2016 3:39:24 PM    [facility] Historical - Cardiovascular: Hypertension-No Additional Notes    GERD (gastroesophageal reflux disease)     Hyperlipidemia     Hyperlipidemia     Hypothyroidism 11/28/2016 3:39:29 PM    [facility] Historical - DO NOT USE: Hypothyroidism-No Additional Notes    Iron deficiency anemia     Iron deficiency anemia 1/14/2021 9:03:48 AM    [facility] Historical - Unknown: Iron deficiency-No Additional Notes    Metabolic syndrome X     ANTONIA on CPAP     Postsurgical malabsorption        Family History   Problem Relation Name Age of Onset    Diabetes Mother      Hypertension Mother         Past Surgical History:   Procedure Laterality Date    BELT ABDOMINOPLASTY      BUNIONECTOMY Right     GASTROSTOMY      partial    HERNIA REPAIR      MYOMECTOMY         Review of Systems   Constitutional:  Positive for fatigue. Negative for activity change, appetite change, chills, fever and unexpected weight change.   HENT:  Negative for congestion, mouth sores, nosebleeds, sore throat, trouble swallowing and voice change.    Eyes:  Negative for photophobia.   Respiratory:  Negative for cough, chest tightness, shortness of breath and wheezing.    Cardiovascular:  Negative for chest pain and leg swelling.   Gastrointestinal:  Negative for abdominal distention, abdominal pain, blood in stool, constipation, diarrhea, nausea and vomiting.   Genitourinary:  Positive for menstrual problem. Negative for difficulty urinating, dysuria and hematuria.   Musculoskeletal:  Negative for arthralgias, back pain and myalgias.   Skin:  Negative for pallor, rash and wound.   Neurological:  Negative for dizziness,  syncope, weakness and headaches.   Hematological:  Negative for adenopathy. Does not bruise/bleed easily.   Psychiatric/Behavioral:  The patient is not nervous/anxious.        Medication List with Changes/Refills   Current Medications    AMLODIPINE (NORVASC) 10 MG TABLET    Take 1 tablet (10 mg total) by mouth once daily.    ASCORBIC ACID, VITAMIN C, (VITAMIN C) 100 MG TABLET    Take 100 mg by mouth once daily.    CARVEDILOL (COREG) 25 MG TABLET    Take 1 tablet (25 mg total) by mouth 2 (two) times daily with meals.    CHLORHEXIDINE (PERIDEX) 0.12 % SOLUTION        CYCLOBENZAPRINE (FLEXERIL) 10 MG TABLET    Take 1 tablet (10 mg total) by mouth 3 (three) times daily as needed for Muscle spasms. TAKE 1 TABLET BY MOUTH THREE TIMES A DAY AS NEEDED FOR MUSCLE SPASMS    DOCUSATE SODIUM (COLACE) 100 MG CAPSULE    Take 100 mg by mouth 2 (two) times daily.    ERGOCALCIFEROL (ERGOCALCIFEROL) 50,000 UNIT CAP    Take 1 capsule (50,000 Units total) by mouth every 7 days.    FUROSEMIDE (LASIX) 20 MG TABLET    Take 20 mg by mouth daily as needed.    HYDROCODONE-ACETAMINOPHEN (HYCET) SOLUTION 7.5-325 MG/15ML    TK 15 ML PO Q 6 H PRN P    KETOCONAZOLE (NIZORAL) 2 % CREAM        KETOCONAZOLE (NIZORAL) 2 % SHAMPOO        LEVOTHYROXINE (TIROSINT) 25 MCG CAP    Take 1 capsule by mouth Daily.    LOMAIRA 8 MG TAB        MEDROXYPROGESTERONE (PROVERA) 10 MG TABLET    TAKE 2 TABLETS BY MOUTH 2 TIMES A DAY.    METFORMIN (GLUCOPHAGE-XR) 500 MG 24 HR TABLET    2 tablets twice daily    MULTIVITAMIN CAPSULE    Take 1 capsule by mouth once daily.    ONDANSETRON (ZOFRAN-ODT) 8 MG TBDL        OXYCODONE-ACETAMINOPHEN (PERCOCET) 5-325 MG PER TABLET        PANTOPRAZOLE (PROTONIX) 40 MG TABLET    TAKE 1 TABLET BY MOUTH TWICE A DAY    POTASSIUM CHLORIDE (KLOR-CON) 10 MEQ TBSR        TIROSINT 150 MCG CAP    Take 1 capsule by mouth every morning.    TRIAMTERENE-HYDROCHLOROTHIAZIDE 37.5-25 MG (MAXZIDE-25) 37.5-25 MG PER TABLET    TAKE 1 TABLET BY MOUTH  EVERY DAY    VALACYCLOVIR (VALTREX) 1000 MG TABLET        ZINC SULFATE (ZINC-15 ORAL)    Take by mouth.     Objective:   There were no vitals filed for this visit.  Lab Results   Component Value Date    WBC 6.18 01/03/2025    HGB 13.8 01/03/2025    HCT 42.8 01/03/2025    MCV 96 01/03/2025     01/03/2025       BMP  Lab Results   Component Value Date     09/26/2024    K 3.9 09/26/2024    CL 99 09/26/2024    CO2 29 09/26/2024    BUN 14 09/26/2024    CREATININE 0.74 09/26/2024    CALCIUM 9.1 09/26/2024    ANIONGAP 11 04/01/2024    ESTGFRAFRICA >60 08/20/2021    EGFRNONAA 87 01/31/2022     Lab Results   Component Value Date    ALT 21 09/26/2024    AST 28 09/26/2024    GGT 46 11/15/2018    ALKPHOS 52 07/17/2020    BILITOT 0.3 09/26/2024       Physical Exam  Pulmonary:      Effort: Pulmonary effort is normal. No respiratory distress.   Neurological:      Mental Status: She is alert and oriented to person, place, and time.        Assessment:     Problem List Items Addressed This Visit          Oncology    Iron deficiency anemia - Primary     Interval improvement in iron deficiency. Interval rise in previously normal hemoglobin. Saturated iron 12% other iron labs wnl. Patient s/p uterine fibroid embolization     For now will hold off on additional IV iron. Arrange 2 months f/u with [facility] iron ferritin               Plan:     Iron deficiency anemia due to chronic blood loss        Med Onc Chart Routing      Follow up with physician    Follow up with [facility] 2 months.   Infusion scheduling note    Injection scheduling note    Labs CBC, ferritin and iron and TIBC   Scheduling:  Preferred lab:  Lab interval:  1-2 days prior   Imaging None      Pharmacy appointment No pharmacy appointment needed      Other referrals       No additional referrals needed               SAMMI Leavitt

## 2025-03-07 ENCOUNTER — TELEPHONE (OUTPATIENT)
Dept: HEMATOLOGY/ONCOLOGY | Facility: CLINIC | Age: 45
End: 2025-03-07
Payer: COMMERCIAL

## 2025-03-07 NOTE — TELEPHONE ENCOUNTER
----- Message from Claribel sent at 3/7/2025 12:03 PM CST -----  Type:  Patient Requesting ReferralWho Called:ptDoes the patient already have the specialty appointment scheduled?:yesIf yes, what is the date of that appointment?:03/07Referral to What Specialty:labReason for Referral:multipleDoes the patient want the referral with a specific physician?:yesIs the specialist an [facility] or [facility]?:nonPatient Requesting a Response?:yesWould the patient rather a call back or a response via [facility]? [facility] Call Back Number:[phone] Information: requesting a call back asap regarding getting labs sent to [facility] as she is there now

## 2025-03-10 ENCOUNTER — OFFICE VISIT (OUTPATIENT)
Dept: HEMATOLOGY/ONCOLOGY | Facility: CLINIC | Age: 45
End: 2025-03-10
Payer: COMMERCIAL

## 2025-03-10 DIAGNOSIS — Z86.2 HISTORY OF IRON DEFICIENCY ANEMIA: Primary | ICD-10-CM

## 2025-03-10 NOTE — ASSESSMENT & PLAN NOTE
Most recent labs without iron deficiency or anemia    F/u 6 months with repeat labs. Discussed S&S to report sooner

## 2025-03-10 NOTE — PROGRESS NOTES
Subjective:       Patient ID: Marcia Hazel is a 44 y.o. female.    Chief Complaint: f/u h/o JANET    HPI: 44-year-old woman with history of obesity status post gastric sleeve 2016 with hiatal hernia and GERD.  She also has history of menorrhagia and has been following with Gynecology.  She notes prior use of estrogen containing oral contraceptive and has been off since May. She does note history of right lower extremity DVT in calf in the setting of immobility after a surgery and completed about 6 months of Eliquis.     She had been followed by Dr. Beckford, outside hematologist/oncologist for history of iron deficiency anemia.  She notes in the past receiving 8 doses of IV iron and more recently in August receive 4 more doses of IV iron therapy with subsequent lab work showing mild anemia hemoglobin 11, normal saturation, TIBC, transferrin.  No ferritin checked.  Patient also notes her outside oncologist had concerns about white blood cell count however per these labs within normal limits and normal differential.     She reports h/o mammogram for breast cancer screening which she reports is benign.  She has never had colonoscopy.  No family history of malignancy.    Uterine fibroid emobolization 12/2024    Today's visit:  Patient presenting today for follow up of her JANET. She notes ongoing fatigue. Denies other interval clinical concerns.     The patient location is: [place]  The chief complaint leading to consultation is: f/u JANET    Visit type: audiovisual    Face to Face time with patient: 10 minutes  20 minutes of total time spent on the encounter, which includes face to face time and non-face to face time preparing to see the patient (eg, review of tests), Obtaining and/or reviewing separately obtained history, Documenting clinical information in the electronic or other health record, Independently interpreting results (not separately reported) and communicating results to the patient/family/caregiver, or Care  coordination (not separately reported).         Each patient to whom he or she provides medical services by telemedicine is:  (1) informed of the relationship between the physician and patient and the respective role of any other health care provider with respect to management of the patient; and (2) notified that he or she may decline to receive medical services by telemedicine and may withdraw from such care at any time.    Notes:        Social History     Socioeconomic History    Marital status: Single   Tobacco Use    Smoking status: Never    Smokeless tobacco: Never   Substance and Sexual Activity    Alcohol use: Yes     Comment: social    Drug use: No    Sexual activity: Yes     Social Drivers of Health     Financial Resource Strain: Low Risk  (12/11/2024)    Received from [facility]    Overall Financial Resource Strain (CARDIA)     Difficulty of Paying Living Expenses: Not hard at all   Food Insecurity: No Food Insecurity (12/11/2024)    Received from [facility]    Hunger Vital Sign     Worried About Running Out of Food in the Last Year: Never true     Ran Out of Food in the Last Year: Never true   Transportation Needs: No Transportation Needs (12/11/2024)    Received from [facility]    PRAPARE - Transportation     Lack of Transportation (Medical): No     Lack of Transportation (Non-Medical): No   Physical Activity: Inactive (1/28/2024)    Exercise Vital Sign     Days of Exercise per Week: 0 days     Minutes of Exercise per Session: 0 min   Stress: No Stress Concern Present (1/28/2024)    [employer] [place] [employer] - Occupational Stress Questionnaire     Feeling of Stress : Not at all   Housing Stability: Unknown (12/11/2024)    Received from [facility]    Housing Stability Vital Sign     Unable to Pay for Housing in the Last Year: No     Homeless in the Last Year: No       Past Medical History:   Diagnosis Date    Acquired hypothyroidism     Benign hypertension     Deep  vein thrombosis 08/2019    right leg    Dysmetabolic syndrome X 11/28/2016 3:39:32 PM    [facility] Historical - DO NOT USE: Insulin resistance-No Additional Notes    Encounter for general adult medical examination without abnormal findings 04/10/2017    Essential hypertension 11/28/2016 3:39:24 PM    [facility] Historical - Cardiovascular: Hypertension-No Additional Notes    GERD (gastroesophageal reflux disease)     Hyperlipidemia     Hyperlipidemia     Hypothyroidism 11/28/2016 3:39:29 PM    [facility] Historical - DO NOT USE: Hypothyroidism-No Additional Notes    Iron deficiency anemia     Iron deficiency anemia 1/14/2021 9:03:48 AM    [facility] Historical - Unknown: Iron deficiency-No Additional Notes    Metabolic syndrome X     ANTONIA on CPAP     Postsurgical malabsorption        Family History   Problem Relation Name Age of Onset    Diabetes Mother      Hypertension Mother         Past Surgical History:   Procedure Laterality Date    BELT ABDOMINOPLASTY      BUNIONECTOMY Right     GASTROSTOMY      partial    HERNIA REPAIR      MYOMECTOMY         Review of Systems   Constitutional:  Positive for fatigue. Negative for activity change, appetite change, chills, fever and unexpected weight change.   HENT:  Negative for congestion, mouth sores, nosebleeds, sore throat, trouble swallowing and voice change.    Eyes:  Negative for photophobia.   Respiratory:  Negative for cough, chest tightness, shortness of breath and wheezing.    Cardiovascular:  Negative for chest pain and leg swelling.   Gastrointestinal:  Negative for abdominal distention, abdominal pain, blood in stool, constipation, diarrhea, nausea and vomiting.   Genitourinary:  Positive for menstrual problem. Negative for difficulty urinating, dysuria and hematuria.   Musculoskeletal:  Negative for arthralgias, back pain and myalgias.   Skin:  Negative for pallor, rash and wound.   Neurological:  Negative for dizziness, syncope, weakness and headaches.    Hematological:  Negative for adenopathy. Does not bruise/bleed easily.   Psychiatric/Behavioral:  The patient is not nervous/anxious.          Medication List with Changes/Refills   Current Medications    AMLODIPINE (NORVASC) 10 MG TABLET    TAKE 1 TABLET BY MOUTH EVERY DAY    ASCORBIC ACID, VITAMIN C, (VITAMIN C) 100 MG TABLET    Take 100 mg by mouth once daily.    CARVEDILOL (COREG) 25 MG TABLET    Take 1 tablet (25 mg total) by mouth 2 (two) times daily with meals.    CHLORHEXIDINE (PERIDEX) 0.12 % SOLUTION        CYCLOBENZAPRINE (FLEXERIL) 10 MG TABLET    Take 1 tablet (10 mg total) by mouth 3 (three) times daily as needed for Muscle spasms. TAKE 1 TABLET BY MOUTH THREE TIMES A DAY AS NEEDED FOR MUSCLE SPASMS    DOCUSATE SODIUM (COLACE) 100 MG CAPSULE    Take 100 mg by mouth 2 (two) times daily.    ERGOCALCIFEROL (ERGOCALCIFEROL) 50,000 UNIT CAP    Take 1 capsule (50,000 Units total) by mouth every 7 days.    FUROSEMIDE (LASIX) 20 MG TABLET    Take 20 mg by mouth daily as needed.    HYDROCODONE-ACETAMINOPHEN (HYCET) SOLUTION 7.5-325 MG/15ML    TK 15 ML PO Q 6 H PRN P    KETOCONAZOLE (NIZORAL) 2 % CREAM        KETOCONAZOLE (NIZORAL) 2 % SHAMPOO        LEVOTHYROXINE (TIROSINT) 25 MCG CAP    Take 1 capsule by mouth Daily.    LOMAIRA 8 MG TAB        MEDROXYPROGESTERONE (PROVERA) 10 MG TABLET    TAKE 2 TABLETS BY MOUTH 2 TIMES A DAY.    METFORMIN (GLUCOPHAGE-XR) 500 MG 24 HR TABLET    2 tablets twice daily    MULTIVITAMIN CAPSULE    Take 1 capsule by mouth once daily.    ONDANSETRON (ZOFRAN-ODT) 8 MG TBDL        OXYCODONE-ACETAMINOPHEN (PERCOCET) 5-325 MG PER TABLET        PANTOPRAZOLE (PROTONIX) 40 MG TABLET    TAKE 1 TABLET BY MOUTH TWICE A DAY    POTASSIUM CHLORIDE (KLOR-CON) 10 MEQ TBSR        TIROSINT 150 MCG CAP    Take 1 capsule by mouth every morning.    TIRZEPATIDE, WEIGHT LOSS, (ZEPBOUND) 2.5 MG/0.5 ML PNIJ    Inject 2.5 mg into the skin.    TRIAMTERENE-HYDROCHLOROTHIAZIDE 37.5-25 MG (MAXZIDE-25) 37.5-25  MG PER TABLET    TAKE 1 TABLET BY MOUTH EVERY DAY    VALACYCLOVIR (VALTREX) 1000 MG TABLET        ZINC SULFATE (ZINC-15 ORAL)    Take by mouth.     Objective:   There were no vitals filed for this visit.  Lab Results   Component Value Date    WBC 4.8 02/19/2025    HGB 13.8 02/19/2025    HCT 40.1 02/19/2025    MCV 97.6 02/19/2025     02/19/2025       BMP  Lab Results   Component Value Date     02/19/2025    K 3.7 02/19/2025     02/19/2025    CO2 27 02/19/2025    BUN 16 02/19/2025    CREATININE 0.67 02/19/2025    CALCIUM 9.0 02/19/2025    ANIONGAP 11 04/01/2024    ESTGFRAFRICA >60 08/20/2021    EGFRNONAA 87 01/31/2022     Lab Results   Component Value Date    ALT 20 02/19/2025    AST 23 02/19/2025    GGT 46 11/15/2018    ALKPHOS 52 07/17/2020    BILITOT 0.4 02/19/2025       Physical Exam  Pulmonary:      Effort: Pulmonary effort is normal. No respiratory distress.   Neurological:      Mental Status: She is alert and oriented to person, place, and time.        Assessment:     Problem List Items Addressed This Visit          Oncology    History of iron deficiency anemia - Primary    Most recent labs without iron deficiency or anemia    F/u 6 months with repeat labs. Discussed S&S to report sooner         Relevant Orders    CBC Auto Differential    Iron and TIBC    Ferritin         Plan:     History of iron deficiency anemia  -     CBC Auto Differential; Future; Expected date: 03/10/2025  -     Iron and TIBC; Future; Expected date: 03/10/2025  -     Ferritin; Future; Expected date: 03/10/2025        Med Onc Chart Routing      Follow up with physician    Follow up with [facility] 6 months.   Infusion scheduling note    Injection scheduling note    Labs CBC, ferritin and iron and TIBC   Scheduling:  Preferred lab:  Lab interval:  1-2 days prior   Imaging None      Pharmacy appointment No pharmacy appointment needed      Other referrals       No additional referrals needed             Palma Mathew  FNP-C

## 2025-05-08 ENCOUNTER — PATIENT MESSAGE (OUTPATIENT)
Dept: RESEARCH | Facility: HOSPITAL | Age: 45
End: 2025-05-08
Payer: COMMERCIAL